# Patient Record
Sex: MALE | Race: BLACK OR AFRICAN AMERICAN | HISPANIC OR LATINO | Employment: OTHER | URBAN - METROPOLITAN AREA
[De-identification: names, ages, dates, MRNs, and addresses within clinical notes are randomized per-mention and may not be internally consistent; named-entity substitution may affect disease eponyms.]

---

## 2018-05-23 LAB
EST. AVERAGE GLUCOSE BLD GHB EST-MCNC: 169 MG/DL
HBA1C MFR BLD HPLC: 7.5 %

## 2018-08-20 DIAGNOSIS — E11.9 TYPE 2 DIABETES MELLITUS WITHOUT COMPLICATION, WITHOUT LONG-TERM CURRENT USE OF INSULIN (HCC): Primary | ICD-10-CM

## 2018-08-20 DIAGNOSIS — E11.9 TYPE 2 DIABETES MELLITUS WITHOUT COMPLICATION, WITHOUT LONG-TERM CURRENT USE OF INSULIN (HCC): ICD-10-CM

## 2018-08-20 RX ORDER — EMPAGLIFLOZIN 10 MG/1
10 TABLET, FILM COATED ORAL DAILY
Qty: 30 TABLET | Refills: 3 | Status: SHIPPED | OUTPATIENT
Start: 2018-08-20 | End: 2018-11-01

## 2018-08-20 RX ORDER — EMPAGLIFLOZIN 10 MG/1
TABLET, FILM COATED ORAL
Refills: 3 | COMMUNITY
Start: 2018-06-29 | End: 2018-08-20 | Stop reason: SDUPTHER

## 2018-08-20 RX ORDER — EMPAGLIFLOZIN 10 MG/1
10 TABLET, FILM COATED ORAL DAILY
Qty: 30 TABLET | Refills: 3 | Status: SHIPPED | OUTPATIENT
Start: 2018-08-20 | End: 2018-08-20 | Stop reason: SDUPTHER

## 2018-08-21 ENCOUNTER — APPOINTMENT (OUTPATIENT)
Dept: LAB | Facility: HOSPITAL | Age: 48
End: 2018-08-21
Payer: COMMERCIAL

## 2018-08-21 ENCOUNTER — TRANSCRIBE ORDERS (OUTPATIENT)
Dept: ADMINISTRATIVE | Facility: HOSPITAL | Age: 48
End: 2018-08-21

## 2018-08-21 DIAGNOSIS — E11.9 TYPE 2 DIABETES MELLITUS WITHOUT COMPLICATION, UNSPECIFIED WHETHER LONG TERM INSULIN USE (HCC): Primary | ICD-10-CM

## 2018-08-21 DIAGNOSIS — E78.1 PURE HYPERGLYCERIDEMIA: ICD-10-CM

## 2018-08-21 DIAGNOSIS — E11.9 TYPE 2 DIABETES MELLITUS WITHOUT COMPLICATION, UNSPECIFIED WHETHER LONG TERM INSULIN USE (HCC): ICD-10-CM

## 2018-08-21 LAB
CHOLEST SERPL-MCNC: 173 MG/DL
EST. AVERAGE GLUCOSE BLD GHB EST-MCNC: 146 MG/DL
HBA1C MFR BLD: 6.7 % (ref 4.2–6.3)
HDLC SERPL-MCNC: 49 MG/DL (ref 40–59)
LDLC SERPL CALC-MCNC: 110 MG/DL
NONHDLC SERPL-MCNC: 124 MG/DL
TRIGL SERPL-MCNC: 69 MG/DL

## 2018-08-21 PROCEDURE — 80061 LIPID PANEL: CPT

## 2018-08-21 PROCEDURE — 83036 HEMOGLOBIN GLYCOSYLATED A1C: CPT

## 2018-08-21 PROCEDURE — 36415 COLL VENOUS BLD VENIPUNCTURE: CPT

## 2018-08-21 RX ORDER — GLUCOSAMINE HCL/CHONDROITIN SU 500-400 MG
CAPSULE ORAL
COMMUNITY
Start: 2018-03-06

## 2018-08-21 RX ORDER — LANCETS 33 GAUGE
EACH MISCELLANEOUS
COMMUNITY
Start: 2018-03-09 | End: 2018-08-21

## 2018-08-23 ENCOUNTER — OFFICE VISIT (OUTPATIENT)
Dept: FAMILY MEDICINE CLINIC | Facility: CLINIC | Age: 48
End: 2018-08-23
Payer: COMMERCIAL

## 2018-08-23 VITALS
DIASTOLIC BLOOD PRESSURE: 82 MMHG | BODY MASS INDEX: 32.14 KG/M2 | WEIGHT: 217 LBS | SYSTOLIC BLOOD PRESSURE: 120 MMHG | TEMPERATURE: 98 F | HEIGHT: 69 IN | OXYGEN SATURATION: 98 % | RESPIRATION RATE: 16 BRPM | HEART RATE: 76 BPM

## 2018-08-23 DIAGNOSIS — E11.9 DIABETES MELLITUS WITHOUT COMPLICATION (HCC): Primary | ICD-10-CM

## 2018-08-23 PROCEDURE — 99213 OFFICE O/P EST LOW 20 MIN: CPT | Performed by: FAMILY MEDICINE

## 2018-08-23 NOTE — PROGRESS NOTES
Assessment/Plan:    Diabetes mellitus without complication ([facility] 75 )  Lab Results   Component Value Date    HGBA1C 6 7 (H) 08/21/2018       No results for input(s): POCGLU in the last 72 hours  Blood Sugar Average: Last 72 hrs:  Patient's hemoglobin A1c has dropped significantly due to patient's compliance with medication diet and exercise  Therefore we will stop insulin today and increase jardiance 25 mg p o  daily  I advised patient and wife to make sure he takes blood sugars daily fasting in the morning and write them down for me so that in 2-3 weeks we can review sugars  I advised patient that with jardiance as he must drink plenty of water  Patient will monitor blood sugars at home and has acknowledged that if sugars are low he will call me or sugars are high he will call me as well  Diagnoses and all orders for this visit:    Diabetes mellitus without complication (HCC)  -     Empagliflozin (JARDIANCE) 25 MG TABS; Take 1 tablet (25 mg total) by mouth every morning  -     HEMOGLOBIN A1C W/ EAG ESTIMATION; Future    Other orders  -     Discontinue: insulin glargine (BASAGLAR KWIKPEN) 100 units/mL injection pen; inject 22 Unit by Subcutaneous route  every evening as per insulin protocol  -     ONE TOUCH ULTRA TEST test strip; TEST BID  -     Alcohol Swabs 70 % PADS; As directed  -     ONETOUCH DELICA LANCETS FINE MISC; USE BID  -     Discontinue: LANCETS MICRO THIN 33G MISC; test BID  -     glucose blood test strip; test BID          Subjective:      Patient ID: Krysten Staples is a 51 y o  male  This is a pleasant 50-year-old male with no past medical history of type 2 diabetes mellitus who presents today for follow-up visit  Patient states that he has been compliant with his medications and has been taking his blood sugars every morning and writing them down in a notebook that he forgot to bring  Patient denies any chest pain, shortness of breath, nausea vomiting, or blood in the stool  The following portions of the patient's history were reviewed and updated as appropriate:   He  has a past medical history of Anxiety and Depression  He   Patient Active Problem List    Diagnosis Date Noted    [facility] ([facility] ) 22/72/3361     He  has a past surgical history that includes Cholecystectomy (2007) and Hernia repair (2006)  His family history includes Cancer in his maternal uncle; Diabetes in his mother; Hypertension in his maternal grandfather; Nephrolithiasis in his family; Osteoarthritis in his maternal grandmother  He  reports that he quit smoking about 13 years ago  He has never used smokeless tobacco  He reports that he does not drink alcohol or use drugs  Current Outpatient Prescriptions   Medication Sig Dispense Refill    Alcohol Swabs 70 % PADS As directed      glucose blood test strip test BID      Empagliflozin (JARDIANCE) 25 MG TABS Take 1 tablet (25 mg total) by mouth every morning 30 tablet 3    JARDIANCE 10 MG TABS Take 1 tablet (10 mg total) by mouth daily 30 tablet 3    ONE TOUCH ULTRA TEST test strip TEST BID  5    ONETOUCH DELICA LANCETS FINE MISC USE BID  2     No current facility-administered medications for this visit       Review of Systems   Constitutional: Negative for fatigue and fever  HENT: Negative for congestion and sore throat  Eyes: Negative for visual disturbance  Respiratory: Negative for cough, shortness of breath and wheezing  Cardiovascular: Negative for chest pain, palpitations and leg swelling  Gastrointestinal: Negative for abdominal pain, anal bleeding, constipation, diarrhea, nausea and vomiting  Endocrine: Negative for cold intolerance and heat intolerance  Musculoskeletal: Negative for arthralgias and joint swelling  Skin: Negative for color change  Neurological: Negative for dizziness, seizures, syncope, weakness and light-headedness     Psychiatric/Behavioral: Negative for agitation, confusion, sleep disturbance and suicidal ideas  Objective:      /82 (BP Location: Left arm, Patient Position: Sitting, Cuff Size: Adult)   Pulse 76   Temp 98 °F (36 7 °C) (Tympanic)   Resp 16   Ht 5' 9" (1 753 m)   Wt 98 4 kg (217 lb)   SpO2 98%   BMI 32 05 kg/m²          Physical Exam   Constitutional: He is oriented to person, place, and time  He appears well-developed and well-nourished  HENT:   Head: Normocephalic and atraumatic  Eyes: Conjunctivae and EOM are normal  Pupils are equal, round, and reactive to light  Neck: Normal range of motion  Neck supple  No JVD present  Cardiovascular: Normal rate, regular rhythm, normal heart sounds and intact distal pulses  Exam reveals no gallop and no friction rub  No murmur heard  Pulmonary/Chest: Effort normal and breath sounds normal  No respiratory distress  He has no wheezes  Abdominal: Soft  Bowel sounds are normal  He exhibits no distension  There is no tenderness  Musculoskeletal: Normal range of motion  Neurological: He is alert and oriented to person, place, and time  He has normal reflexes  Skin: Skin is warm and dry     Psychiatric: His behavior is normal  Judgment and thought content normal

## 2018-08-23 NOTE — ASSESSMENT & PLAN NOTE
Lab Results   Component Value Date    HGBA1C 6 7 (H) 08/21/2018       No results for input(s): POCGLU in the last 72 hours  Blood Sugar Average: Last 72 hrs:  Patient's hemoglobin A1c has dropped significantly due to patient's compliance with medication diet and exercise  Therefore we will stop insulin today and increase jardiance 25 mg p o  daily  I advised patient and wife to make sure he takes blood sugars daily fasting in the morning and write them down for me so that in 2-3 weeks we can review sugars  I advised patient that with jardiance as he must drink plenty of water  Patient will monitor blood sugars at home and has acknowledged that if sugars are low he will call me or sugars are high he will call me as well

## 2018-08-28 DIAGNOSIS — E11.9 TYPE 2 DIABETES MELLITUS WITHOUT COMPLICATION, WITHOUT LONG-TERM CURRENT USE OF INSULIN (HCC): Primary | ICD-10-CM

## 2018-11-01 DIAGNOSIS — E11.9 DIABETES MELLITUS WITHOUT COMPLICATION (HCC): ICD-10-CM

## 2018-11-07 ENCOUNTER — TELEPHONE (OUTPATIENT)
Dept: FAMILY MEDICINE CLINIC | Facility: CLINIC | Age: 48
End: 2018-11-07

## 2018-11-07 DIAGNOSIS — E11.9 TYPE 2 DIABETES MELLITUS WITHOUT COMPLICATION, WITHOUT LONG-TERM CURRENT USE OF INSULIN (HCC): Primary | ICD-10-CM

## 2018-11-08 ENCOUNTER — TELEPHONE (OUTPATIENT)
Dept: FAMILY MEDICINE CLINIC | Facility: CLINIC | Age: 48
End: 2018-11-08

## 2018-11-08 ENCOUNTER — TELEPHONE (OUTPATIENT)
Dept: OTHER | Facility: OTHER | Age: 48
End: 2018-11-08

## 2018-11-08 DIAGNOSIS — E11.9 DM (DIABETES MELLITUS) (HCC): Primary | ICD-10-CM

## 2018-11-08 RX ORDER — SYRINGE AND NEEDLE,INSULIN,1ML 31 GX5/16"
SYRINGE, EMPTY DISPOSABLE MISCELLANEOUS
Qty: 1 KIT | Refills: 0 | Status: SHIPPED | OUTPATIENT
Start: 2018-11-08 | End: 2018-11-13 | Stop reason: CLARIF

## 2018-11-08 NOTE — TELEPHONE ENCOUNTER
Health call:  I was paged by will give pharmacy regarding patient diabetes glucose strips and glucometer as are not currently accepted by his insurance  Prescribed the patient new machine and new strips that are accepted by his insurance with the help of  [facility]  Free style glucometer and test strips ordered

## 2018-11-08 NOTE — TELEPHONE ENCOUNTER
Paged Dr Ricardo Nobles directly to [facility] regarding the patient's medication

## 2018-11-13 DIAGNOSIS — E11.9 TYPE 2 DIABETES MELLITUS WITHOUT COMPLICATION, WITHOUT LONG-TERM CURRENT USE OF INSULIN (HCC): Primary | ICD-10-CM

## 2018-11-13 RX ORDER — LANCETS 28 GAUGE
EACH MISCELLANEOUS DAILY
Qty: 100 EACH | Refills: 1 | Status: SHIPPED | OUTPATIENT
Start: 2018-11-13 | End: 2019-03-18 | Stop reason: SDUPTHER

## 2018-11-13 RX ORDER — BLOOD-GLUCOSE METER
KIT MISCELLANEOUS DAILY
Qty: 1 EACH | Refills: 0 | Status: SHIPPED | OUTPATIENT
Start: 2018-11-13

## 2018-11-13 NOTE — TELEPHONE ENCOUNTER
They don't make the meter ordered previously  I have ordered what patient will need here  Strips currently listed are good with the meter I added

## 2018-11-26 ENCOUNTER — OFFICE VISIT (OUTPATIENT)
Dept: FAMILY MEDICINE CLINIC | Facility: CLINIC | Age: 48
End: 2018-11-26
Payer: COMMERCIAL

## 2018-11-26 VITALS
BODY MASS INDEX: 31.99 KG/M2 | DIASTOLIC BLOOD PRESSURE: 80 MMHG | OXYGEN SATURATION: 97 % | SYSTOLIC BLOOD PRESSURE: 112 MMHG | HEART RATE: 74 BPM | HEIGHT: 69 IN | WEIGHT: 216 LBS | RESPIRATION RATE: 16 BRPM | TEMPERATURE: 96.6 F

## 2018-11-26 DIAGNOSIS — Z23 NEED FOR INFLUENZA VACCINATION: Primary | ICD-10-CM

## 2018-11-26 DIAGNOSIS — Z23 ENCOUNTER FOR IMMUNIZATION: ICD-10-CM

## 2018-11-26 DIAGNOSIS — E11.9 DIABETES MELLITUS WITHOUT COMPLICATION (HCC): ICD-10-CM

## 2018-11-26 PROCEDURE — 1036F TOBACCO NON-USER: CPT | Performed by: FAMILY MEDICINE

## 2018-11-26 PROCEDURE — 99214 OFFICE O/P EST MOD 30 MIN: CPT | Performed by: FAMILY MEDICINE

## 2018-11-26 PROCEDURE — 3008F BODY MASS INDEX DOCD: CPT | Performed by: FAMILY MEDICINE

## 2018-11-26 PROCEDURE — 90732 PPSV23 VACC 2 YRS+ SUBQ/IM: CPT | Performed by: FAMILY MEDICINE

## 2018-11-26 PROCEDURE — 90471 IMMUNIZATION ADMIN: CPT | Performed by: FAMILY MEDICINE

## 2018-11-26 RX ORDER — AMMONIUM LACTATE 12 G/100G
LOTION TOPICAL 2 TIMES DAILY PRN
Qty: 400 G | Refills: 0 | Status: SHIPPED | OUTPATIENT
Start: 2018-11-26 | End: 2018-12-17 | Stop reason: SDUPTHER

## 2018-11-26 RX ORDER — EMPAGLIFLOZIN 10 MG/1
TABLET, FILM COATED ORAL
Refills: 3 | COMMUNITY
Start: 2018-11-18 | End: 2019-03-18 | Stop reason: SDUPTHER

## 2018-11-26 NOTE — ASSESSMENT & PLAN NOTE
Lab Results   Component Value Date    HGBA1C 6 7 (H) 08/21/2018       No results for input(s): POCGLU in the last 72 hours  Blood Sugar Average: Last 72 hrs:  - Will repeat MIA2F if applicable  Depending on results will consider decreasing jardiance to 10 mg daily; advised patient to continue hydration throughout the day     - Will continue with current medications  - Patient denies any episodes of hypoglycemia  - Patient educated on the importance of proper dieting and exercise

## 2018-11-26 NOTE — PROGRESS NOTES
Assessment/Plan:    Diabetes mellitus without complication ([place] [address] )  Lab Results   Component Value Date    HGBA1C 6 7 (H) 08/21/2018       No results for input(s): POCGLU in the last 72 hours  Blood Sugar Average: Last 72 hrs:  - Will repeat MYV3C if applicable  Depending on results will consider decreasing jardiance to 10 mg daily; advised patient to continue hydration throughout the day  - Will continue with current medications  - Patient denies any episodes of hypoglycemia  - Patient educated on the importance of proper dieting and exercise       Need for influenza vaccination  Patient refusing flu vaccine  Encounter for immunization  Patient given pneumococcal vaccination  Diagnoses and all orders for this visit:    Need for influenza vaccination  -     Cancel: FIRST LINE: influenza vaccine, [id number], quadrivalent, recombinant, PF, 0 5 mL (FLUBLOK)    Encounter for immunization  -     PNEUMOCOCCAL POLYSACCHARIDE VACCINE 23-VALENT =>1YO SQ IM    Diabetes mellitus without complication ([place] [address] )  -     HEMOGLOBIN A1C W/ EAG ESTIMATION; Future  -     Basic metabolic panel; Future  -     ammonium lactate (LAC-HYDRIN) 12 % lotion; Apply topically 2 (two) times a day as needed for dry skin    Other orders  -     JARDIANCE 10 MG TABS; TK 1 T PO QD IN THE MORNING          Subjective:      Patient ID: Kevin Vale is a 51 y o  male  This is a pleasant 67-year-old male with no past medical history of type 2 diabetes mellitus who presents today for follow-up visit  Patient states that he has been somewhat compliant with his medications and has been taking his blood sugars every morning with an average of 100  Patient denies any chest pain, shortness of breath, nausea vomiting, or blood in the stool  The following portions of the patient's history were reviewed and updated as appropriate:   He  has a past medical history of Anxiety and Depression    He   Patient Active Problem List    Diagnosis Date Noted    Need for influenza vaccination 11/26/2018    Encounter for immunization 11/26/2018    Diabetes mellitus without complication ([facility] ) 21/72/7258     He  has a past surgical history that includes Cholecystectomy (2007) and Hernia repair (2006)  His family history includes Cancer in his maternal uncle; Diabetes in his mother; Hypertension in his maternal grandfather; Nephrolithiasis in his family; Osteoarthritis in his maternal grandmother  He  reports that he quit smoking about 13 years ago  He has never used smokeless tobacco  He reports that he does not drink alcohol or use drugs  Current Outpatient Prescriptions   Medication Sig Dispense Refill    Alcohol Swabs 70 % PADS As directed      ammonium lactate (LAC-HYDRIN) 12 % lotion Apply topically 2 (two) times a day as needed for dry skin 400 g 0    Blood Glucose Monitoring Suppl (FREESTYLE LITE) FRANCIS by Other route daily 1 each 0    glucose blood (FREESTYLE LITE) test strip Use as instructed 100 each 0    JARDIANCE 10 MG TABS TK 1 T PO QD IN THE MORNING  3    Lancets (FREESTYLE) lancets by Other route daily Use as instructed 100 each 1     No current facility-administered medications for this visit       Review of Systems   Constitutional: Negative for fatigue and fever  HENT: Negative for congestion and sore throat  Eyes: Negative for visual disturbance  Respiratory: Negative for cough, shortness of breath and wheezing  Cardiovascular: Negative for chest pain, palpitations and leg swelling  Gastrointestinal: Negative for abdominal pain, anal bleeding, constipation, diarrhea, nausea and vomiting  Endocrine: Negative for cold intolerance and heat intolerance  Musculoskeletal: Negative for arthralgias and joint swelling  Skin: Negative for color change  Neurological: Negative for dizziness, seizures, syncope, weakness and light-headedness     Psychiatric/Behavioral: Negative for agitation, confusion, sleep disturbance and suicidal ideas  Objective:      /80 (BP Location: Right arm, Patient Position: Sitting, Cuff Size: Large)   Pulse 74   Temp (!) 96 6 °F (35 9 °C) (Temporal)   Resp 16   Ht 5' 9" (1 753 m)   Wt 98 kg (216 lb)   SpO2 97%   BMI 31 90 kg/m²       Patient's shoes and socks removed  Right Foot/Ankle   Right Foot Inspection  Skin Exam: skin normal and skin intact no dry skin, no warmth, no callus, no erythema, no maceration, no abnormal color, no pre-ulcer, no ulcer and no callus                          Toe Exam: ROM and strength within normal limits  Sensory   Vibration: intact  Proprioception: intact   Monofilament testing: intact  Vascular  Capillary refills: < 3 seconds  The right DP pulse is 2+  The right PT pulse is 2+  Left Foot/Ankle  Left Foot Inspection  Skin Exam: skin normal and skin intactno dry skin, no warmth, no erythema, no maceration, normal color, no pre-ulcer, no ulcer and no callus                         Toe Exam: ROM and strength within normal limits                   Sensory   Vibration: intact  Proprioception: intact  Monofilament: intact  Vascular  Capillary refills: < 3 seconds  The left DP pulse is 2+  The left PT pulse is 2+  Assign Risk Category:  No deformity present; No loss of protective sensation; No weak pulses       Risk: 0     Physical Exam   Constitutional: He is oriented to person, place, and time  He appears well-developed and well-nourished  HENT:   Head: Normocephalic and atraumatic  Eyes: Pupils are equal, round, and reactive to light  Conjunctivae and EOM are normal    Neck: Normal range of motion  Neck supple  No JVD present  Cardiovascular: Normal rate, regular rhythm, normal heart sounds and intact distal pulses  Exam reveals no gallop and no friction rub  Pulses are no weak pulses  No murmur heard  Pulses:       Dorsalis pedis pulses are 2+ on the right side, and 2+ on the left side          Posterior tibial pulses are 2+ on the right side, and 2+ on the left side  Pulmonary/Chest: Effort normal and breath sounds normal  No respiratory distress  He has no wheezes  Abdominal: Soft  Bowel sounds are normal  He exhibits no distension  There is no tenderness  Musculoskeletal: Normal range of motion  Feet:   Right Foot:   Skin Integrity: Negative for ulcer, skin breakdown, erythema, warmth, callus or dry skin  Left Foot:   Skin Integrity: Negative for ulcer, skin breakdown, erythema, warmth, callus or dry skin  Neurological: He is alert and oriented to person, place, and time  He has normal reflexes  Skin: Skin is warm and dry     Psychiatric: His behavior is normal  Judgment and thought content normal

## 2018-11-28 ENCOUNTER — APPOINTMENT (OUTPATIENT)
Dept: LAB | Facility: HOSPITAL | Age: 48
End: 2018-11-28
Payer: COMMERCIAL

## 2018-11-28 DIAGNOSIS — E11.9 DIABETES MELLITUS WITHOUT COMPLICATION (HCC): ICD-10-CM

## 2018-11-28 LAB
ANION GAP SERPL CALCULATED.3IONS-SCNC: 10 MMOL/L (ref 5–14)
BUN SERPL-MCNC: 17 MG/DL (ref 5–25)
CALCIUM SERPL-MCNC: 9.3 MG/DL (ref 8.4–10.2)
CHLORIDE SERPL-SCNC: 102 MMOL/L (ref 97–108)
CO2 SERPL-SCNC: 29 MMOL/L (ref 22–30)
CREAT SERPL-MCNC: 0.76 MG/DL (ref 0.7–1.5)
CREAT UR-MCNC: 99.7 MG/DL
EST. AVERAGE GLUCOSE BLD GHB EST-MCNC: 154 MG/DL
GFR SERPL CREATININE-BSD FRML MDRD: 107 ML/MIN/1.73SQ M
GLUCOSE P FAST SERPL-MCNC: 120 MG/DL (ref 70–99)
HBA1C MFR BLD: 7 % (ref 4.2–6.3)
MICROALBUMIN UR-MCNC: 8 MG/L (ref 0–20)
MICROALBUMIN/CREAT 24H UR: 8 MG/G CREATININE (ref 0–30)
POTASSIUM SERPL-SCNC: 4.3 MMOL/L (ref 3.6–5)
SODIUM SERPL-SCNC: 141 MMOL/L (ref 137–147)

## 2018-11-28 PROCEDURE — 83036 HEMOGLOBIN GLYCOSYLATED A1C: CPT

## 2018-11-28 PROCEDURE — 36415 COLL VENOUS BLD VENIPUNCTURE: CPT

## 2018-11-28 PROCEDURE — 80048 BASIC METABOLIC PNL TOTAL CA: CPT

## 2018-11-28 PROCEDURE — 82043 UR ALBUMIN QUANTITATIVE: CPT

## 2018-11-28 PROCEDURE — 82570 ASSAY OF URINE CREATININE: CPT

## 2018-11-29 ENCOUNTER — TELEPHONE (OUTPATIENT)
Dept: FAMILY MEDICINE CLINIC | Facility: CLINIC | Age: 48
End: 2018-11-29

## 2018-11-29 NOTE — TELEPHONE ENCOUNTER
Called patient to inform him of HBA1C results  Stressed to patient the importance of taking his medication every day as directed  Patient and spouse acknowledged

## 2018-12-17 DIAGNOSIS — E11.9 DIABETES MELLITUS WITHOUT COMPLICATION (HCC): ICD-10-CM

## 2018-12-17 RX ORDER — AMMONIUM LACTATE 12 G/100G
LOTION TOPICAL 2 TIMES DAILY PRN
Qty: 400 G | Refills: 0 | Status: SHIPPED | OUTPATIENT
Start: 2018-12-17 | End: 2019-10-16

## 2019-03-13 DIAGNOSIS — E11.9 DM (DIABETES MELLITUS) (HCC): ICD-10-CM

## 2019-03-18 DIAGNOSIS — E11.9 TYPE 2 DIABETES MELLITUS WITHOUT COMPLICATION, WITHOUT LONG-TERM CURRENT USE OF INSULIN (HCC): ICD-10-CM

## 2019-03-18 DIAGNOSIS — E11.9 DM (DIABETES MELLITUS) (HCC): ICD-10-CM

## 2019-03-18 RX ORDER — LANCETS 28 GAUGE
EACH MISCELLANEOUS DAILY
Qty: 100 EACH | Refills: 5 | Status: SHIPPED | OUTPATIENT
Start: 2019-03-18 | End: 2019-06-20 | Stop reason: SDUPTHER

## 2019-03-18 RX ORDER — EMPAGLIFLOZIN 10 MG/1
10 TABLET, FILM COATED ORAL DAILY
Qty: 90 TABLET | Refills: 1 | Status: SHIPPED | OUTPATIENT
Start: 2019-03-18 | End: 2019-06-20 | Stop reason: SDUPTHER

## 2019-03-21 ENCOUNTER — APPOINTMENT (OUTPATIENT)
Dept: LAB | Facility: HOSPITAL | Age: 49
End: 2019-03-21
Payer: COMMERCIAL

## 2019-03-21 ENCOUNTER — TRANSCRIBE ORDERS (OUTPATIENT)
Dept: ADMINISTRATIVE | Facility: HOSPITAL | Age: 49
End: 2019-03-21

## 2019-03-21 DIAGNOSIS — E11.9 TYPE 2 DIABETES MELLITUS WITHOUT COMPLICATION, WITHOUT LONG-TERM CURRENT USE OF INSULIN (HCC): ICD-10-CM

## 2019-03-21 LAB
EST. AVERAGE GLUCOSE BLD GHB EST-MCNC: 140 MG/DL
HBA1C MFR BLD: 6.5 % (ref 4.2–6.3)

## 2019-03-21 PROCEDURE — 83036 HEMOGLOBIN GLYCOSYLATED A1C: CPT

## 2019-03-21 PROCEDURE — 36415 COLL VENOUS BLD VENIPUNCTURE: CPT

## 2019-03-22 ENCOUNTER — TELEPHONE (OUTPATIENT)
Dept: FAMILY MEDICINE CLINIC | Facility: CLINIC | Age: 49
End: 2019-03-22

## 2019-06-10 ENCOUNTER — OFFICE VISIT (OUTPATIENT)
Dept: FAMILY MEDICINE CLINIC | Facility: CLINIC | Age: 49
End: 2019-06-10

## 2019-06-10 VITALS
HEIGHT: 69 IN | BODY MASS INDEX: 31.55 KG/M2 | SYSTOLIC BLOOD PRESSURE: 122 MMHG | OXYGEN SATURATION: 98 % | WEIGHT: 213 LBS | HEART RATE: 79 BPM | TEMPERATURE: 97.6 F | DIASTOLIC BLOOD PRESSURE: 78 MMHG | RESPIRATION RATE: 18 BRPM

## 2019-06-10 DIAGNOSIS — R21 LOCALIZED RASH: ICD-10-CM

## 2019-06-10 DIAGNOSIS — M75.42 IMPINGEMENT SYNDROME OF LEFT SHOULDER: Primary | ICD-10-CM

## 2019-06-10 PROBLEM — Z23 NEED FOR INFLUENZA VACCINATION: Status: RESOLVED | Noted: 2018-11-26 | Resolved: 2019-06-10

## 2019-06-10 PROBLEM — Z23 ENCOUNTER FOR IMMUNIZATION: Status: RESOLVED | Noted: 2018-11-26 | Resolved: 2019-06-10

## 2019-06-10 PROCEDURE — 99213 OFFICE O/P EST LOW 20 MIN: CPT | Performed by: INTERNAL MEDICINE

## 2019-06-10 PROCEDURE — 20610 DRAIN/INJ JOINT/BURSA W/O US: CPT | Performed by: INTERNAL MEDICINE

## 2019-06-10 RX ORDER — TRIAMCINOLONE ACETONIDE 40 MG/ML
40 INJECTION, SUSPENSION INTRA-ARTICULAR; INTRAMUSCULAR
Status: COMPLETED | OUTPATIENT
Start: 2019-06-10 | End: 2019-06-10

## 2019-06-10 RX ORDER — TRIAMCINOLONE ACETONIDE 5 MG/G
CREAM TOPICAL 3 TIMES DAILY
Qty: 30 G | Refills: 1 | Status: SHIPPED | OUTPATIENT
Start: 2019-06-10 | End: 2019-10-16

## 2019-06-10 RX ORDER — LIDOCAINE HYDROCHLORIDE 20 MG/ML
3 INJECTION, SOLUTION EPIDURAL; INFILTRATION; INTRACAUDAL; PERINEURAL
Status: COMPLETED | OUTPATIENT
Start: 2019-06-10 | End: 2019-06-10

## 2019-06-10 RX ADMIN — LIDOCAINE HYDROCHLORIDE 3 ML: 20 INJECTION, SOLUTION EPIDURAL; INFILTRATION; INTRACAUDAL; PERINEURAL at 15:59

## 2019-06-10 RX ADMIN — TRIAMCINOLONE ACETONIDE 40 MG: 40 INJECTION, SUSPENSION INTRA-ARTICULAR; INTRAMUSCULAR at 15:59

## 2019-06-20 ENCOUNTER — OFFICE VISIT (OUTPATIENT)
Dept: FAMILY MEDICINE CLINIC | Facility: CLINIC | Age: 49
End: 2019-06-20

## 2019-06-20 VITALS
BODY MASS INDEX: 31.99 KG/M2 | OXYGEN SATURATION: 98 % | DIASTOLIC BLOOD PRESSURE: 70 MMHG | TEMPERATURE: 97.6 F | RESPIRATION RATE: 18 BRPM | WEIGHT: 216 LBS | HEART RATE: 72 BPM | SYSTOLIC BLOOD PRESSURE: 122 MMHG | HEIGHT: 69 IN

## 2019-06-20 DIAGNOSIS — E11.9 TYPE 2 DIABETES MELLITUS WITHOUT COMPLICATION, WITHOUT LONG-TERM CURRENT USE OF INSULIN (HCC): ICD-10-CM

## 2019-06-20 DIAGNOSIS — Z53.20 COLONOSCOPY REFUSED: ICD-10-CM

## 2019-06-20 DIAGNOSIS — E11.9 DM (DIABETES MELLITUS) (HCC): ICD-10-CM

## 2019-06-20 DIAGNOSIS — E11.9 DIABETES MELLITUS WITHOUT COMPLICATION (HCC): Primary | ICD-10-CM

## 2019-06-20 PROBLEM — R21 LOCALIZED RASH: Status: RESOLVED | Noted: 2019-06-10 | Resolved: 2019-06-20

## 2019-06-20 PROBLEM — M75.42 IMPINGEMENT SYNDROME OF LEFT SHOULDER: Status: RESOLVED | Noted: 2019-06-10 | Resolved: 2019-06-20

## 2019-06-20 LAB — SL AMB POCT HEMOGLOBIN AIC: 7.1 (ref ?–6.5)

## 2019-06-20 PROCEDURE — 83036 HEMOGLOBIN GLYCOSYLATED A1C: CPT | Performed by: INTERNAL MEDICINE

## 2019-06-20 PROCEDURE — 99213 OFFICE O/P EST LOW 20 MIN: CPT | Performed by: INTERNAL MEDICINE

## 2019-06-20 RX ORDER — EMPAGLIFLOZIN 10 MG/1
10 TABLET, FILM COATED ORAL DAILY
Qty: 90 TABLET | Refills: 2 | Status: SHIPPED | OUTPATIENT
Start: 2019-06-20 | End: 2019-09-04 | Stop reason: SDUPTHER

## 2019-06-20 RX ORDER — LANCETS 28 GAUGE
EACH MISCELLANEOUS DAILY
Qty: 100 EACH | Refills: 5 | Status: SHIPPED | OUTPATIENT
Start: 2019-06-20

## 2019-09-04 DIAGNOSIS — E11.9 TYPE 2 DIABETES MELLITUS WITHOUT COMPLICATION, WITHOUT LONG-TERM CURRENT USE OF INSULIN (HCC): ICD-10-CM

## 2019-09-04 RX ORDER — EMPAGLIFLOZIN 10 MG/1
10 TABLET, FILM COATED ORAL DAILY
Qty: 90 TABLET | Refills: 2 | Status: SHIPPED | OUTPATIENT
Start: 2019-09-04 | End: 2019-10-16 | Stop reason: SDUPTHER

## 2019-09-04 NOTE — PROGRESS NOTES
- refilled patient's medication and sent to lab to draw BMP, lipid panel and A1c follow-up next week to review labs

## 2019-10-08 ENCOUNTER — APPOINTMENT (OUTPATIENT)
Dept: LAB | Facility: HOSPITAL | Age: 49
End: 2019-10-08
Attending: FAMILY MEDICINE
Payer: COMMERCIAL

## 2019-10-08 DIAGNOSIS — E11.9 TYPE 2 DIABETES MELLITUS WITHOUT COMPLICATION, WITHOUT LONG-TERM CURRENT USE OF INSULIN (HCC): ICD-10-CM

## 2019-10-08 LAB
ANION GAP SERPL CALCULATED.3IONS-SCNC: 8 MMOL/L (ref 5–14)
BUN SERPL-MCNC: 17 MG/DL (ref 5–25)
CALCIUM SERPL-MCNC: 9.8 MG/DL (ref 8.4–10.2)
CHLORIDE SERPL-SCNC: 103 MMOL/L (ref 97–108)
CHOLEST SERPL-MCNC: 184 MG/DL
CO2 SERPL-SCNC: 30 MMOL/L (ref 22–30)
CREAT SERPL-MCNC: 0.78 MG/DL (ref 0.7–1.5)
EST. AVERAGE GLUCOSE BLD GHB EST-MCNC: 143 MG/DL
GFR SERPL CREATININE-BSD FRML MDRD: 105 ML/MIN/1.73SQ M
GLUCOSE P FAST SERPL-MCNC: 131 MG/DL (ref 70–99)
HBA1C MFR BLD: 6.6 % (ref 4.2–6.3)
HDLC SERPL-MCNC: 45 MG/DL (ref 40–59)
LDLC SERPL CALC-MCNC: 127 MG/DL
NONHDLC SERPL-MCNC: 139 MG/DL
POTASSIUM SERPL-SCNC: 4.5 MMOL/L (ref 3.6–5)
SODIUM SERPL-SCNC: 141 MMOL/L (ref 137–147)
TRIGL SERPL-MCNC: 62 MG/DL

## 2019-10-08 PROCEDURE — 80048 BASIC METABOLIC PNL TOTAL CA: CPT

## 2019-10-08 PROCEDURE — 83036 HEMOGLOBIN GLYCOSYLATED A1C: CPT

## 2019-10-08 PROCEDURE — 80061 LIPID PANEL: CPT

## 2019-10-08 PROCEDURE — 36415 COLL VENOUS BLD VENIPUNCTURE: CPT

## 2019-10-16 ENCOUNTER — OFFICE VISIT (OUTPATIENT)
Dept: FAMILY MEDICINE CLINIC | Facility: CLINIC | Age: 49
End: 2019-10-16

## 2019-10-16 VITALS
BODY MASS INDEX: 32.29 KG/M2 | RESPIRATION RATE: 18 BRPM | HEART RATE: 62 BPM | TEMPERATURE: 97.3 F | WEIGHT: 218 LBS | DIASTOLIC BLOOD PRESSURE: 78 MMHG | OXYGEN SATURATION: 98 % | HEIGHT: 69 IN | SYSTOLIC BLOOD PRESSURE: 110 MMHG

## 2019-10-16 DIAGNOSIS — Z23 ENCOUNTER FOR IMMUNIZATION: ICD-10-CM

## 2019-10-16 DIAGNOSIS — E11.9 DIABETES MELLITUS WITHOUT COMPLICATION (HCC): Primary | ICD-10-CM

## 2019-10-16 DIAGNOSIS — E11.9 TYPE 2 DIABETES MELLITUS WITHOUT COMPLICATION, WITHOUT LONG-TERM CURRENT USE OF INSULIN (HCC): ICD-10-CM

## 2019-10-16 PROCEDURE — 99213 OFFICE O/P EST LOW 20 MIN: CPT | Performed by: FAMILY MEDICINE

## 2019-10-16 PROCEDURE — 3725F SCREEN DEPRESSION PERFORMED: CPT | Performed by: FAMILY MEDICINE

## 2019-10-16 PROCEDURE — 1036F TOBACCO NON-USER: CPT | Performed by: FAMILY MEDICINE

## 2019-10-16 PROCEDURE — 3008F BODY MASS INDEX DOCD: CPT | Performed by: FAMILY MEDICINE

## 2019-10-16 RX ORDER — EMPAGLIFLOZIN 10 MG/1
10 TABLET, FILM COATED ORAL DAILY
Qty: 90 TABLET | Refills: 2 | Status: SHIPPED | OUTPATIENT
Start: 2019-10-16 | End: 2019-12-10 | Stop reason: SDUPTHER

## 2019-10-16 NOTE — ASSESSMENT & PLAN NOTE
Lab Results   Component Value Date    HGBA1C 6 6 (H) 10/08/2019       - patient to continue current management with jardiance 10 mg daily  - at this point patient would like to continue dietary lifestyle modifications for reducing his cardiovascular risk instead of taking atorvastatin  Risk and benefits discussed with patient and he is aware

## 2019-10-16 NOTE — PROGRESS NOTES
Chief Complaint   Patient presents with    Diabetes     follow up recent A1C 10/08/19 6 6     /78 (BP Location: Left arm, Patient Position: Sitting, Cuff Size: Large)   Pulse 62   Temp (!) 97 3 °F (36 3 °C) (Temporal)   Resp 18   Ht 5' 9" (1 753 m)   Wt 98 9 kg (218 lb)   SpO2 98%   BMI 32 19 kg/m²      Assessment/Plan   Diabetes mellitus without complication (HCC)    Lab Results   Component Value Date    HGBA1C 6 6 (H) 10/08/2019       - patient to continue current management with jardiance 10 mg daily  - at this point patient would like to continue dietary lifestyle modifications for reducing his cardiovascular risk instead of taking atorvastatin  Risk and benefits discussed with patient and he is aware  Encounter for immunization  - flu vaccine refused today by patient  Diagnoses and all orders for this visit:    Diabetes mellitus without complication ([facility] )    Encounter for immunization    Type 2 diabetes mellitus without complication, without long-term current use of insulin ([facility] )  -     JARDIANCE 10 MG TABS; Take 1 tablet (10 mg total) by mouth daily    Other orders  -     Cancel: influenza vaccine, [id number], quadrivalent, recombinant, PF, 0 5 mL, for patients 18 yr+ (FLUBLOK)       Wenceslao Shoemaker is a 47 y o  male  Information was obtained from the patient  The language used was [place]ish    Patient here for diabetes follow-up and reports that he is doing well  Compliant with his medication and denies any side effects  Reports that he is staying well hydrated and adhering to dietary lifestyle modifications  Patient is asking for refill on his medication as he is planning to leave the country to his hometown for holidays  Otherwise, no other complaints  Review of Systems   Constitutional: Negative for fatigue and fever  HENT: Negative for congestion and sore throat  Eyes: Negative for visual disturbance     Respiratory: Negative for cough, shortness of breath and wheezing  Cardiovascular: Negative for chest pain, palpitations and leg swelling  Gastrointestinal: Negative for abdominal pain, anal bleeding, constipation, diarrhea, nausea and vomiting  Endocrine: Negative for cold intolerance and heat intolerance  Musculoskeletal: Negative for arthralgias and joint swelling  Skin: Negative for color change  Neurological: Negative for dizziness, seizures, syncope, weakness and light-headedness  Psychiatric/Behavioral: Negative for agitation, confusion, sleep disturbance and suicidal ideas  Pertinent items are noted in HPI  Social History  - reports that he quit smoking about 14 years ago  He has never used smokeless tobacco   - reports that he does not drink alcohol    - reports that he does not use drugs  Objective     Physical Exam   Constitutional: He is oriented to person, place, and time  He appears well-developed and well-nourished  He is active and cooperative  He does not have a sickly appearance  He does not appear ill  No distress  HENT:   Head: Normocephalic and atraumatic  Right Ear: External ear normal    Left Ear: External ear normal    Eyes: Conjunctivae are normal    Neck: Normal range of motion  Neck supple  Pulmonary/Chest: Effort normal    Musculoskeletal: Normal range of motion  Neurological: He is alert and oriented to person, place, and time  Skin: No rash noted  No erythema  No pallor  Vitals reviewed  Health Information     The following have been reviewed and updated:   He  has a past medical history of Anxiety and Depression  He   Patient Active Problem List    Diagnosis Date Noted    Colonoscopy refused 06/20/2019    Encounter for immunization 11/26/2018    [facility] 60/98/7718     He  has a past surgical history that includes Cholecystectomy (2007) and Hernia repair (2006)    His family history includes Cancer in his maternal uncle; Diabetes in his mother; Hypertension in his maternal grandfather; Nephrolithiasis in his family; Osteoarthritis in his maternal grandmother  He  reports that he quit smoking about 14 years ago  He has never used smokeless tobacco  He reports that he does not drink alcohol or use drugs  Current Outpatient Medications   Medication Sig Dispense Refill    Alcohol Swabs 70 % PADS As directed      Blood Glucose Monitoring Suppl (FREESTYLE LITE) FRANCIS by Other route daily 1 each 0    glucose blood (FREESTYLE LITE) test strip TEST TWICE DAILY 100 each 5    JARDIANCE 10 MG TABS Take 1 tablet (10 mg total) by mouth daily 90 tablet 2    Lancets (FREESTYLE) lancets by Other route daily Use as instructed 100 each 5     No current facility-administered medications for this visit          Allergies   Allergen Reactions    Morphine GI Intolerance       Current Outpatient Medications:     Alcohol Swabs 70 % PADS, As directed, Disp: , Rfl:     Blood Glucose Monitoring Suppl (FREESTYLE LITE) FRANCIS, by Other route daily, Disp: 1 each, Rfl: 0    glucose blood (FREESTYLE LITE) test strip, TEST TWICE DAILY, Disp: 100 each, Rfl: 5    JARDIANCE 10 MG TABS, Take 1 tablet (10 mg total) by mouth daily, Disp: 90 tablet, Rfl: 2    Lancets (FREESTYLE) lancets, by Other route daily Use as instructed, Disp: 100 each, Rfl: 5    Patient Active Problem List   Diagnosis    [facility] mellitus without [facility] )    Encounter for immunization    Colonoscopy refused     Past Surgical History:   Procedure Laterality Date    CHOLECYSTECTOMY  2007    HERNIA REPAIR  2006     Family History   Problem Relation Age of Onset    Diabetes Mother         Mellitus    Osteoarthritis Maternal Grandmother     Hypertension Maternal Grandfather     Nephrolithiasis Family     Cancer Maternal Uncle         Unknown     Health Maintenance   Topic Date Due    CRC Screening: Colonoscopy  1969    DM Eye Exam  05/21/1979    BMI: Followup Plan  05/21/1987    HEPATITIS B VACCINES (1 of 3 - Risk 3-dose series) 05/21/1988    INFLUENZA VACCINE  07/01/2019    Depression Screening PHQ  08/23/2019    Diabetic Foot Exam  11/26/2019    URINE MICROALBUMIN  11/28/2019    HEMOGLOBIN A1C  04/08/2020    BMI: Adult  06/20/2020    DTaP,Tdap,and Td Vaccines (2 - Td) 09/13/2026    Pneumococcal Vaccine: 65+ Years (1 of 2 - PCV13) 05/21/2034    Pneumococcal Vaccine: Pediatrics (0 to 5 Years) and At-Risk Patients (6 to 59 Years)  Completed

## 2019-12-10 DIAGNOSIS — E11.9 TYPE 2 DIABETES MELLITUS WITHOUT COMPLICATION, WITHOUT LONG-TERM CURRENT USE OF INSULIN (HCC): ICD-10-CM

## 2019-12-10 RX ORDER — EMPAGLIFLOZIN 10 MG/1
10 TABLET, FILM COATED ORAL DAILY
Qty: 90 TABLET | Refills: 2 | Status: SHIPPED | OUTPATIENT
Start: 2019-12-10 | End: 2020-12-22

## 2020-01-15 ENCOUNTER — TELEPHONE (OUTPATIENT)
Dept: FAMILY MEDICINE CLINIC | Facility: CLINIC | Age: 50
End: 2020-01-15

## 2020-01-21 ENCOUNTER — TELEPHONE (OUTPATIENT)
Dept: FAMILY MEDICINE CLINIC | Facility: CLINIC | Age: 50
End: 2020-01-21

## 2020-01-21 PROBLEM — Z23 ENCOUNTER FOR IMMUNIZATION: Status: RESOLVED | Noted: 2018-11-26 | Resolved: 2020-01-21

## 2020-04-30 ENCOUNTER — OFFICE VISIT (OUTPATIENT)
Dept: FAMILY MEDICINE CLINIC | Facility: CLINIC | Age: 50
End: 2020-04-30

## 2020-04-30 VITALS
HEIGHT: 69 IN | HEART RATE: 65 BPM | DIASTOLIC BLOOD PRESSURE: 80 MMHG | RESPIRATION RATE: 22 BRPM | WEIGHT: 217.2 LBS | OXYGEN SATURATION: 92 % | BODY MASS INDEX: 32.17 KG/M2 | SYSTOLIC BLOOD PRESSURE: 128 MMHG | TEMPERATURE: 97 F

## 2020-04-30 DIAGNOSIS — M72.2 PLANTAR FASCIITIS OF RIGHT FOOT: Primary | ICD-10-CM

## 2020-04-30 DIAGNOSIS — E11.9 TYPE 2 DIABETES MELLITUS WITHOUT COMPLICATION, WITHOUT LONG-TERM CURRENT USE OF INSULIN (HCC): ICD-10-CM

## 2020-04-30 DIAGNOSIS — E11.9 DIABETES MELLITUS WITHOUT COMPLICATION (HCC): ICD-10-CM

## 2020-04-30 PROCEDURE — 1036F TOBACCO NON-USER: CPT | Performed by: FAMILY MEDICINE

## 2020-04-30 PROCEDURE — 99213 OFFICE O/P EST LOW 20 MIN: CPT | Performed by: FAMILY MEDICINE

## 2020-04-30 PROCEDURE — 3008F BODY MASS INDEX DOCD: CPT | Performed by: FAMILY MEDICINE

## 2020-04-30 RX ORDER — PREDNISONE 20 MG/1
20 TABLET ORAL DAILY
Qty: 5 TABLET | Refills: 0 | Status: SHIPPED | OUTPATIENT
Start: 2020-04-30 | End: 2020-05-05

## 2020-05-28 DIAGNOSIS — M72.2 PLANTAR FASCIITIS OF RIGHT FOOT: Primary | ICD-10-CM

## 2020-06-10 DIAGNOSIS — E11.9 TYPE 2 DIABETES MELLITUS WITHOUT COMPLICATION, WITHOUT LONG-TERM CURRENT USE OF INSULIN (HCC): Primary | ICD-10-CM

## 2020-06-22 ENCOUNTER — TELEPHONE (OUTPATIENT)
Dept: FAMILY MEDICINE CLINIC | Facility: CLINIC | Age: 50
End: 2020-06-22

## 2020-06-25 ENCOUNTER — OFFICE VISIT (OUTPATIENT)
Dept: FAMILY MEDICINE CLINIC | Facility: CLINIC | Age: 50
End: 2020-06-25

## 2020-06-25 VITALS
TEMPERATURE: 97.5 F | HEIGHT: 69 IN | SYSTOLIC BLOOD PRESSURE: 140 MMHG | DIASTOLIC BLOOD PRESSURE: 86 MMHG | WEIGHT: 214.4 LBS | OXYGEN SATURATION: 97 % | RESPIRATION RATE: 16 BRPM | HEART RATE: 79 BPM | BODY MASS INDEX: 31.76 KG/M2

## 2020-06-25 DIAGNOSIS — E11.9 DIABETES MELLITUS WITHOUT COMPLICATION (HCC): ICD-10-CM

## 2020-06-25 DIAGNOSIS — R07.9 CHEST PAIN, UNSPECIFIED TYPE: Primary | ICD-10-CM

## 2020-06-25 DIAGNOSIS — Z13.220 SCREENING FOR CHOLESTEROL LEVEL: ICD-10-CM

## 2020-06-25 DIAGNOSIS — Z11.4 ENCOUNTER FOR SCREENING FOR HIV: ICD-10-CM

## 2020-06-25 DIAGNOSIS — Z20.822 ENCOUNTER FOR LABORATORY TESTING FOR COVID-19 VIRUS: ICD-10-CM

## 2020-06-25 DIAGNOSIS — R09.82 POST-NASAL DRIP: ICD-10-CM

## 2020-06-25 PROCEDURE — 93000 ELECTROCARDIOGRAM COMPLETE: CPT | Performed by: FAMILY MEDICINE

## 2020-06-25 PROCEDURE — 1036F TOBACCO NON-USER: CPT | Performed by: FAMILY MEDICINE

## 2020-06-25 PROCEDURE — 3008F BODY MASS INDEX DOCD: CPT | Performed by: FAMILY MEDICINE

## 2020-06-25 PROCEDURE — 99214 OFFICE O/P EST MOD 30 MIN: CPT | Performed by: FAMILY MEDICINE

## 2020-06-25 PROCEDURE — 82570 ASSAY OF URINE CREATININE: CPT | Performed by: FAMILY MEDICINE

## 2020-06-25 PROCEDURE — 82043 UR ALBUMIN QUANTITATIVE: CPT | Performed by: FAMILY MEDICINE

## 2020-06-25 RX ORDER — FLUTICASONE PROPIONATE 50 MCG
1 SPRAY, SUSPENSION (ML) NASAL DAILY
Qty: 1 BOTTLE | Refills: 1 | Status: SHIPPED | OUTPATIENT
Start: 2020-06-25

## 2020-06-25 RX ORDER — LORATADINE 10 MG/1
10 TABLET ORAL DAILY
Qty: 30 TABLET | Refills: 1 | Status: SHIPPED | OUTPATIENT
Start: 2020-06-25 | End: 2021-05-27

## 2020-06-26 LAB
CREAT UR-MCNC: 130 MG/DL
MICROALBUMIN UR-MCNC: 24.7 MG/L (ref 0–20)
MICROALBUMIN/CREAT 24H UR: 19 MG/G CREATININE (ref 0–30)

## 2020-06-26 PROCEDURE — 3061F NEG MICROALBUMINURIA REV: CPT | Performed by: FAMILY MEDICINE

## 2020-10-19 ENCOUNTER — TELEPHONE (OUTPATIENT)
Dept: FAMILY MEDICINE CLINIC | Facility: CLINIC | Age: 50
End: 2020-10-19

## 2020-10-19 DIAGNOSIS — M54.41 ACUTE RIGHT-SIDED LOW BACK PAIN WITH RIGHT-SIDED SCIATICA: Primary | ICD-10-CM

## 2020-10-19 RX ORDER — NAPROXEN 500 MG/1
500 TABLET ORAL 2 TIMES DAILY WITH MEALS
Qty: 30 TABLET | Refills: 0 | Status: SHIPPED | OUTPATIENT
Start: 2020-10-19 | End: 2020-11-05

## 2020-11-05 ENCOUNTER — OFFICE VISIT (OUTPATIENT)
Dept: FAMILY MEDICINE CLINIC | Facility: CLINIC | Age: 50
End: 2020-11-05

## 2020-11-05 VITALS
DIASTOLIC BLOOD PRESSURE: 84 MMHG | BODY MASS INDEX: 32.5 KG/M2 | HEART RATE: 75 BPM | HEIGHT: 69 IN | RESPIRATION RATE: 20 BRPM | TEMPERATURE: 98.1 F | WEIGHT: 219.4 LBS | SYSTOLIC BLOOD PRESSURE: 128 MMHG | OXYGEN SATURATION: 96 %

## 2020-11-05 DIAGNOSIS — E11.9 DIABETES MELLITUS WITHOUT COMPLICATION (HCC): Primary | ICD-10-CM

## 2020-11-05 DIAGNOSIS — M54.41 ACUTE RIGHT-SIDED LOW BACK PAIN WITH RIGHT-SIDED SCIATICA: ICD-10-CM

## 2020-11-05 PROBLEM — Z13.220 SCREENING FOR CHOLESTEROL LEVEL: Status: RESOLVED | Noted: 2020-06-25 | Resolved: 2020-11-05

## 2020-11-05 PROBLEM — R09.82 POST-NASAL DRIP: Status: RESOLVED | Noted: 2020-06-25 | Resolved: 2020-11-05

## 2020-11-05 PROBLEM — Z11.4 ENCOUNTER FOR SCREENING FOR HIV: Status: RESOLVED | Noted: 2020-06-25 | Resolved: 2020-11-05

## 2020-11-05 PROBLEM — R07.9 CHEST PAIN: Status: RESOLVED | Noted: 2020-06-25 | Resolved: 2020-11-05

## 2020-11-05 PROBLEM — M54.40 ACUTE RIGHT-SIDED LOW BACK PAIN WITH SCIATICA: Status: ACTIVE | Noted: 2020-11-05

## 2020-11-05 PROBLEM — Z20.822 ENCOUNTER FOR LABORATORY TESTING FOR COVID-19 VIRUS: Status: RESOLVED | Noted: 2020-06-25 | Resolved: 2020-11-05

## 2020-11-05 PROCEDURE — 1036F TOBACCO NON-USER: CPT | Performed by: FAMILY MEDICINE

## 2020-11-05 PROCEDURE — 3725F SCREEN DEPRESSION PERFORMED: CPT | Performed by: FAMILY MEDICINE

## 2020-11-05 PROCEDURE — 99213 OFFICE O/P EST LOW 20 MIN: CPT | Performed by: FAMILY MEDICINE

## 2020-11-05 PROCEDURE — 3008F BODY MASS INDEX DOCD: CPT | Performed by: FAMILY MEDICINE

## 2020-12-22 DIAGNOSIS — E11.9 TYPE 2 DIABETES MELLITUS WITHOUT COMPLICATION, WITHOUT LONG-TERM CURRENT USE OF INSULIN (HCC): ICD-10-CM

## 2020-12-22 RX ORDER — EMPAGLIFLOZIN 10 MG/1
TABLET, FILM COATED ORAL
Qty: 90 TABLET | Refills: 2 | Status: SHIPPED | OUTPATIENT
Start: 2020-12-22 | End: 2021-05-07 | Stop reason: SDUPTHER

## 2021-04-29 ENCOUNTER — TELEPHONE (OUTPATIENT)
Dept: OBGYN CLINIC | Facility: HOSPITAL | Age: 51
End: 2021-04-29

## 2021-04-29 DIAGNOSIS — M72.2 PLANTAR FASCIITIS OF RIGHT FOOT: Primary | ICD-10-CM

## 2021-04-29 DIAGNOSIS — M72.2 PLANTAR FASCIITIS OF LEFT FOOT: ICD-10-CM

## 2021-04-29 DIAGNOSIS — M54.41 ACUTE RIGHT-SIDED LOW BACK PAIN WITH RIGHT-SIDED SCIATICA: ICD-10-CM

## 2021-04-29 RX ORDER — MELOXICAM 15 MG/1
15 TABLET ORAL DAILY
Qty: 30 TABLET | Refills: 0 | Status: SHIPPED | OUTPATIENT
Start: 2021-04-29 | End: 2021-05-27

## 2021-04-29 NOTE — PROGRESS NOTES
Spouse reports that patient has been having worsening of his plantar fasciitis pain with difficulty walking in the morning due to pain  Patient also been has been experiencing significant bilateral lower back pain  Therefore, we will start meloxicam 15 mg once daily advised to stop naproxen  Advised to take with food  Will refer to Podiatry stat for evaluation of his plantar fascitis and possible need of corticosteroid injection  Patient will follow-up with me in 1 month

## 2021-05-01 ENCOUNTER — HOSPITAL ENCOUNTER (OUTPATIENT)
Dept: RADIOLOGY | Facility: HOSPITAL | Age: 51
Discharge: HOME | End: 2021-05-01
Attending: FAMILY MEDICINE
Payer: COMMERCIAL

## 2021-05-01 DIAGNOSIS — M54.41 ACUTE RIGHT-SIDED LOW BACK PAIN WITH RIGHT-SIDED SCIATICA: ICD-10-CM

## 2021-05-01 PROCEDURE — 72110 X-RAY EXAM L-2 SPINE 4/>VWS: CPT

## 2021-05-05 ENCOUNTER — OFFICE VISIT (OUTPATIENT)
Dept: PODIATRY | Facility: CLINIC | Age: 51
End: 2021-05-05
Payer: COMMERCIAL

## 2021-05-05 VITALS
BODY MASS INDEX: 31.22 KG/M2 | SYSTOLIC BLOOD PRESSURE: 112 MMHG | HEIGHT: 69 IN | WEIGHT: 210.8 LBS | DIASTOLIC BLOOD PRESSURE: 77 MMHG | HEART RATE: 67 BPM

## 2021-05-05 DIAGNOSIS — M72.2 PLANTAR FASCIITIS OF LEFT FOOT: ICD-10-CM

## 2021-05-05 DIAGNOSIS — M72.2 PLANTAR FASCIITIS OF RIGHT FOOT: ICD-10-CM

## 2021-05-05 DIAGNOSIS — M79.671 RIGHT FOOT PAIN: Primary | ICD-10-CM

## 2021-05-05 PROCEDURE — 20550 NJX 1 TENDON SHEATH/LIGAMENT: CPT | Performed by: PODIATRIST

## 2021-05-05 PROCEDURE — 99242 OFF/OP CONSLTJ NEW/EST SF 20: CPT | Performed by: PODIATRIST

## 2021-05-05 PROCEDURE — 1036F TOBACCO NON-USER: CPT | Performed by: PODIATRIST

## 2021-05-05 RX ORDER — LIDOCAINE HYDROCHLORIDE 10 MG/ML
1 INJECTION, SOLUTION EPIDURAL; INFILTRATION; INTRACAUDAL; PERINEURAL ONCE
Status: COMPLETED | OUTPATIENT
Start: 2021-05-05 | End: 2021-05-05

## 2021-05-05 RX ORDER — TRIAMCINOLONE ACETONIDE 40 MG/ML
20 INJECTION, SUSPENSION INTRA-ARTICULAR; INTRAMUSCULAR ONCE
Status: COMPLETED | OUTPATIENT
Start: 2021-05-05 | End: 2021-05-05

## 2021-05-05 RX ORDER — BUPIVACAINE HYDROCHLORIDE 5 MG/ML
1 INJECTION, SOLUTION EPIDURAL; INTRACAUDAL ONCE
Status: COMPLETED | OUTPATIENT
Start: 2021-05-05 | End: 2021-05-05

## 2021-05-05 RX ADMIN — LIDOCAINE HYDROCHLORIDE 1 ML: 10 INJECTION, SOLUTION EPIDURAL; INFILTRATION; INTRACAUDAL; PERINEURAL at 10:04

## 2021-05-05 RX ADMIN — BUPIVACAINE HYDROCHLORIDE 1 ML: 5 INJECTION, SOLUTION EPIDURAL; INTRACAUDAL at 10:06

## 2021-05-05 RX ADMIN — TRIAMCINOLONE ACETONIDE 20 MG: 40 INJECTION, SUSPENSION INTRA-ARTICULAR; INTRAMUSCULAR at 10:04

## 2021-05-05 NOTE — PROGRESS NOTES
Assessment/Plan:      Explained the patient that his symptoms are consistent with plantar fasciitis of the right heel  Urged patient to refrain from walking barefoot  Dispensed heel inserts to maximize support  Stretching exercises were recommended  Injected right heel with 0 5 cc Kenalog 40 along with 1 cc 1% xylocaine and 1 cc 0 5% Marcaine  Patient to continue with the meloxicam     No problem-specific Assessment & Plan notes found for this encounter  Diagnoses and all orders for this visit:    Right foot pain    Plantar fasciitis of right foot  -     Ambulatory referral to Podiatry  -     bupivacaine (PF) (MARCAINE) 0 5 % injection 1 mL  -     lidocaine (PF) (XYLOCAINE-MPF) 1 % injection 1 mL  -     triamcinolone acetonide (KENALOG-40) 40 mg/mL injection 20 mg    Plantar fasciitis of left foot  -     Ambulatory referral to Podiatry          Subjective:      Patient ID: Elsa Rojas is a 45 y o  male  HPI       Patient, a 19-year-old   Type 2 diabetic Romansh-speaking male presents with an   His chief complaint is right heel pain of 6 month duration  No left heel pain is related  Patient has pain with each step  No trauma is noted  Patient had been given naproxen for his pain but was not getting much relief  He was recently switched to meloxicam but still has significant discomfort  Last A1c dated 10/08/2019 was 6 6  The following portions of the patient's history were reviewed and updated as appropriate: allergies, current medications, past family history, past medical history, past social history, past surgical history and problem list     Review of Systems   Cardiovascular: Negative  Gastrointestinal: Negative  Musculoskeletal: Negative  Neurological: Negative for numbness                   Objective:      /77   Pulse 67   Ht 5' 9" (1 753 m) Comment: verbal  Wt 95 6 kg (210 lb 12 8 oz)   BMI 31 13 kg/m²          Physical Exam  Constitutional:       Appearance: Normal appearance  Cardiovascular:      Pulses: Normal pulses  Musculoskeletal:         General: Tenderness present  Comments: Pain with palpation medial and central aspect right heel at fascia insertion into the calcaneus  Skin:     General: Skin is warm  Neurological:      General: No focal deficit present  Mental Status: He is oriented to person, place, and time  Comments: Tinel sign negative for tarsal tunnel syndrome  Foot injection     Date/Time 5/5/2021 10:11 AM     Performed by  Abdiaziz العلي DPM     Authorized by Abdiaziz العلي DPM      Universal Protocol   Consent: Verbal consent obtained  Risks and benefits: risks, benefits and alternatives were discussed  Consent given by: patient  Patient understanding: patient states understanding of the procedure being performed  Patient identity confirmed: verbally with patient        Local anesthesia used: yes     Anesthesia   Local anesthesia used: yes  Local Anesthetic: lidocaine 1% without epinephrine and bupivacaine 0 5% without epinephrine     Procedure Details   Procedure Notes:   Injected right heel with 0 5 cc Kenalog 40 along with 1 cc 1% xylocaine and 1 cc 0 5% Marcaine

## 2021-05-07 DIAGNOSIS — E11.9 TYPE 2 DIABETES MELLITUS WITHOUT COMPLICATION, WITHOUT LONG-TERM CURRENT USE OF INSULIN (HCC): ICD-10-CM

## 2021-05-07 RX ORDER — EMPAGLIFLOZIN 10 MG/1
10 TABLET, FILM COATED ORAL DAILY
Qty: 90 TABLET | Refills: 2 | Status: SHIPPED | OUTPATIENT
Start: 2021-05-07 | End: 2021-05-27 | Stop reason: SDUPTHER

## 2021-05-26 ENCOUNTER — OFFICE VISIT (OUTPATIENT)
Dept: PODIATRY | Facility: CLINIC | Age: 51
End: 2021-05-26
Payer: COMMERCIAL

## 2021-05-26 VITALS
HEIGHT: 69 IN | WEIGHT: 213 LBS | SYSTOLIC BLOOD PRESSURE: 120 MMHG | HEART RATE: 83 BPM | DIASTOLIC BLOOD PRESSURE: 75 MMHG | BODY MASS INDEX: 31.55 KG/M2

## 2021-05-26 DIAGNOSIS — M72.2 PLANTAR FASCIITIS OF RIGHT FOOT: Primary | ICD-10-CM

## 2021-05-26 PROCEDURE — 20550 NJX 1 TENDON SHEATH/LIGAMENT: CPT | Performed by: PODIATRIST

## 2021-05-26 RX ORDER — TRIAMCINOLONE ACETONIDE 40 MG/ML
20 INJECTION, SUSPENSION INTRA-ARTICULAR; INTRAMUSCULAR ONCE
Status: COMPLETED | OUTPATIENT
Start: 2021-05-26 | End: 2021-05-26

## 2021-05-26 RX ORDER — LIDOCAINE HYDROCHLORIDE 10 MG/ML
1 INJECTION, SOLUTION EPIDURAL; INFILTRATION; INTRACAUDAL; PERINEURAL ONCE
Status: COMPLETED | OUTPATIENT
Start: 2021-05-26 | End: 2021-05-26

## 2021-05-26 RX ORDER — BUPIVACAINE HYDROCHLORIDE 5 MG/ML
1 INJECTION, SOLUTION EPIDURAL; INTRACAUDAL ONCE
Status: COMPLETED | OUTPATIENT
Start: 2021-05-26 | End: 2021-05-26

## 2021-05-26 RX ADMIN — TRIAMCINOLONE ACETONIDE 20 MG: 40 INJECTION, SUSPENSION INTRA-ARTICULAR; INTRAMUSCULAR at 11:18

## 2021-05-26 RX ADMIN — LIDOCAINE HYDROCHLORIDE 1 ML: 10 INJECTION, SOLUTION EPIDURAL; INFILTRATION; INTRACAUDAL; PERINEURAL at 11:17

## 2021-05-26 RX ADMIN — BUPIVACAINE HYDROCHLORIDE 1 ML: 5 INJECTION, SOLUTION EPIDURAL; INTRACAUDAL at 11:17

## 2021-05-26 NOTE — PROGRESS NOTES
Patient presents for assessment of right heel  He is here with an   He relates significant improvement following the cortisone injection given at last visit  He rates his discomfort as a 3/10 but desires another injection  On exam, pain with palpation medial aspect right heel at fascia insertion into the calcaneus  Treatment:  Reinjected right heel with 0 5 cc Kenalog 40 along with 1 cc 1% xylocaine and 1 cc 0 5% Marcaine  Dispensed a pair of heel supports at his request           Foot injection     Date/Time 5/26/2021 11:17 AM     Performed by  Sveta Montez DPM     Authorized by Sveta Montez DPM      Universal Protocol   Consent: Verbal consent obtained  Risks and benefits: risks, benefits and alternatives were discussed  Consent given by: patient  Patient understanding: patient states understanding of the procedure being performed  Patient identity confirmed: verbally with patient        Local anesthesia used: yes     Anesthesia   Local anesthesia used: yes  Local Anesthetic: lidocaine 1% without epinephrine and bupivacaine 0 5% without epinephrine     Procedure Details   Procedure Notes:   Injected right heel with 0 5 cc Kenalog 40 along with 1 cc 1% xylocaine and 1 cc 0 5% Marcaine

## 2021-05-27 ENCOUNTER — TELEPHONE (OUTPATIENT)
Dept: FAMILY MEDICINE CLINIC | Facility: CLINIC | Age: 51
End: 2021-05-27

## 2021-05-27 ENCOUNTER — OFFICE VISIT (OUTPATIENT)
Dept: FAMILY MEDICINE CLINIC | Facility: CLINIC | Age: 51
End: 2021-05-27

## 2021-05-27 VITALS
OXYGEN SATURATION: 97 % | TEMPERATURE: 98.6 F | HEART RATE: 59 BPM | DIASTOLIC BLOOD PRESSURE: 78 MMHG | RESPIRATION RATE: 20 BRPM | HEIGHT: 69 IN | BODY MASS INDEX: 30.87 KG/M2 | WEIGHT: 208.4 LBS | SYSTOLIC BLOOD PRESSURE: 102 MMHG

## 2021-05-27 DIAGNOSIS — E11.9 DIABETES MELLITUS WITHOUT COMPLICATION (HCC): ICD-10-CM

## 2021-05-27 DIAGNOSIS — G89.29 CHRONIC BILATERAL LOW BACK PAIN WITHOUT SCIATICA: ICD-10-CM

## 2021-05-27 DIAGNOSIS — Z53.20 COLONOSCOPY REFUSED: ICD-10-CM

## 2021-05-27 DIAGNOSIS — E11.9 TYPE 2 DIABETES MELLITUS WITHOUT COMPLICATION, WITHOUT LONG-TERM CURRENT USE OF INSULIN (HCC): Primary | ICD-10-CM

## 2021-05-27 DIAGNOSIS — M72.2 PLANTAR FASCIITIS OF RIGHT FOOT: ICD-10-CM

## 2021-05-27 DIAGNOSIS — M54.50 CHRONIC BILATERAL LOW BACK PAIN WITHOUT SCIATICA: ICD-10-CM

## 2021-05-27 PROBLEM — M54.40 CHRONIC RIGHT-SIDED LOW BACK PAIN WITH SCIATICA: Status: RESOLVED | Noted: 2020-11-05 | Resolved: 2021-05-27

## 2021-05-27 LAB
LEFT EYE DIABETIC RETINOPATHY: NORMAL
LEFT EYE IMAGE QUALITY: NORMAL
LEFT EYE MACULAR EDEMA: NORMAL
LEFT EYE OTHER RETINOPATHY: NORMAL
RIGHT EYE DIABETIC RETINOPATHY: NORMAL
RIGHT EYE IMAGE QUALITY: NORMAL
RIGHT EYE MACULAR EDEMA: NORMAL
RIGHT EYE OTHER RETINOPATHY: NORMAL
SEVERITY (EYE EXAM): NORMAL
SL AMB POCT HEMOGLOBIN AIC: 7.2 (ref ?–6.5)

## 2021-05-27 PROCEDURE — 99214 OFFICE O/P EST MOD 30 MIN: CPT | Performed by: FAMILY MEDICINE

## 2021-05-27 PROCEDURE — 3008F BODY MASS INDEX DOCD: CPT | Performed by: FAMILY MEDICINE

## 2021-05-27 PROCEDURE — 3008F BODY MASS INDEX DOCD: CPT | Performed by: PODIATRIST

## 2021-05-27 PROCEDURE — 3051F HG A1C>EQUAL 7.0%<8.0%: CPT | Performed by: PODIATRIST

## 2021-05-27 PROCEDURE — 3051F HG A1C>EQUAL 7.0%<8.0%: CPT | Performed by: FAMILY MEDICINE

## 2021-05-27 PROCEDURE — 2025F 7 FLD RTA PHOTO W/O RTNOPTHY: CPT | Performed by: FAMILY MEDICINE

## 2021-05-27 PROCEDURE — 83036 HEMOGLOBIN GLYCOSYLATED A1C: CPT | Performed by: FAMILY MEDICINE

## 2021-05-27 PROCEDURE — 2025F 7 FLD RTA PHOTO W/O RTNOPTHY: CPT | Performed by: PODIATRIST

## 2021-05-27 PROCEDURE — 1036F TOBACCO NON-USER: CPT | Performed by: FAMILY MEDICINE

## 2021-05-27 RX ORDER — EMPAGLIFLOZIN 10 MG/1
10 TABLET, FILM COATED ORAL DAILY
Qty: 90 TABLET | Refills: 2 | Status: SHIPPED | OUTPATIENT
Start: 2021-05-27 | End: 2022-03-11

## 2021-05-27 NOTE — ASSESSMENT & PLAN NOTE
Lab Results   Component Value Date    HGBA1C 7 2 (A) 05/27/2021       - hemoglobin A1c ordered  -patient to continue Jardiance 10 mg once daily  - reports that fasting blood glucoses have been consistently under 120

## 2021-05-27 NOTE — ASSESSMENT & PLAN NOTE
- at the moment patient refuses colonoscopy stating that he had a diagnostic colonoscopy several years ago when he was having episodes of diarrhea  She reports that the moment he would like to wait before obtaining another 1

## 2021-05-27 NOTE — ASSESSMENT & PLAN NOTE
Logan Solorio reviewed   - Patient refuses PT  - Continue supportive care  - Will discuss further tx on follow up visit

## 2021-05-27 NOTE — PROGRESS NOTES
Assessment/Plan:    Plantar fasciitis of right foot  - Following with podiatry    Diabetes mellitus without complication ([facility] [address] )    Lab Results   Component Value Date    HGBA1C 7 2 (A) 05/27/2021       - hemoglobin A1c ordered  -patient to continue Jardiance 10 mg once daily  - reports that fasting blood glucoses have been consistently under 120  Colonoscopy refused  - at the moment patient refuses colonoscopy stating that he had a diagnostic colonoscopy several years ago when he was having episodes of diarrhea  She reports that the moment he would like to wait before obtaining another 1  Chronic bilateral low back pain without sciatica  - Xray reviewed   - Patient refuses PT  - Continue supportive care  - Will discuss further tx on follow up visit       Diagnoses and all orders for this visit:    Type 2 diabetes mellitus without complication, without long-term current use of insulin (HCC)  -     POCT hemoglobin A1c  -     IRIS Diabetic eye exam  -     Jardiance 10 MG TABS; Take 1 tablet (10 mg total) by mouth daily    Diabetes mellitus without complication (HCC)    Plantar fasciitis of right foot    Colonoscopy refused    Chronic bilateral low back pain without sciatica          Subjective:      Patient ID: Sujit Madera is a 45 y o  male  Pleasant patient 69-year-old male with past medical his type 2 diabetes mellitus very well controlled presents the office today for follow-up visit of his chronic conditions  Patient reports that he continues to have back pain is intermittent and located bilaterally close to his piriformis muscles (Please see below for rest)  Patient reports that he has not received his Jardiance from the pharmacy and has not been taking his medication in about a month  No other complaints  Back Pain  This is a recurrent problem  The current episode started more than 1 month ago  The problem occurs daily  The problem has been gradually improving since onset   The pain is present in the gluteal and lumbar spine  The quality of the pain is described as aching  The pain radiates to the right thigh, right foot and right knee  The pain is at a severity of 0/10  The patient is experiencing no pain  The symptoms are aggravated by bending, position and twisting  Pertinent negatives include no abdominal pain, bladder incontinence, bowel incontinence, chest pain, dysuria, fever, headaches, leg pain, numbness, paresis, paresthesias, pelvic pain, perianal numbness, tingling, weakness or weight loss  Risk factors include sedentary lifestyle, obesity and poor posture  He has tried NSAIDs for the symptoms  The treatment provided no relief  The following portions of the patient's history were reviewed and updated as appropriate:   He  has a past medical history of Anxiety and Depression  He   Patient Active Problem List    Diagnosis Date Noted    Chronic bilateral low back pain without sciatica 05/27/2021    Plantar fasciitis of right foot 04/30/2020    Colonoscopy refused 06/20/2019    [facility] 17/57/[facility]661     He  has a past surgical history that includes Cholecystectomy (2007) and Hernia repair (2006)  His family history includes Cancer in his maternal uncle; Diabetes in his mother; Hypertension in his maternal grandfather; Nephrolithiasis in his family; Osteoarthritis in his maternal grandmother  He  reports that he has never smoked  He has never used smokeless tobacco  He reports previous alcohol use  He reports that he does not use drugs    Current Outpatient Medications   Medication Sig Dispense Refill    Alcohol Swabs 70 % PADS As directed      Blood Glucose Monitoring Suppl (FREESTYLE LITE) FRANCIS by Other route daily 1 each 0    fluticasone (FLONASE) 50 mcg/act nasal spray 1 spray into each nostril daily 1 Bottle 1    glucose blood (FREESTYLE LITE) test strip TEST TWICE DAILY 100 each 5    Jardiance 10 MG TABS Take 1 tablet (10 mg total) by mouth daily 90 tablet 2    Lancets (FREESTYLE) lancets by Other route daily Use as instructed 100 each 5     No current facility-administered medications for this visit       Review of Systems   Constitutional: Negative for fatigue, fever and weight loss  HENT: Negative for congestion and sore throat  Eyes: Negative for visual disturbance  Respiratory: Negative for cough, shortness of breath and wheezing  Cardiovascular: Negative for chest pain, palpitations and leg swelling  Gastrointestinal: Negative for abdominal pain, anal bleeding, bowel incontinence, constipation, diarrhea, nausea and vomiting  Endocrine: Negative for cold intolerance and heat intolerance  Genitourinary: Negative for bladder incontinence, dysuria and pelvic pain  Musculoskeletal: Positive for back pain  Negative for arthralgias and joint swelling  Skin: Negative for color change  Neurological: Negative for dizziness, tingling, seizures, syncope, weakness, light-headedness, numbness, headaches and paresthesias  Psychiatric/Behavioral: Negative for agitation, confusion, sleep disturbance and suicidal ideas  Objective:      /78 (BP Location: Left arm, Patient Position: Sitting, Cuff Size: Standard)   Pulse 59   Temp 98 6 °F (37 °C) (Temporal)   Resp 20   Ht 5' 9" (1 753 m)   Wt 94 5 kg (208 lb 6 4 oz)   SpO2 97%   BMI 30 78 kg/m²          Physical Exam  Constitutional:       Appearance: He is well-developed  HENT:      Head: Normocephalic and atraumatic  Eyes:      Conjunctiva/sclera: Conjunctivae normal       Pupils: Pupils are equal, round, and reactive to light  Neck:      Musculoskeletal: Normal range of motion and neck supple  Vascular: No JVD  Cardiovascular:      Rate and Rhythm: Normal rate and regular rhythm  Heart sounds: Normal heart sounds  No murmur  No friction rub  No gallop  Pulmonary:      Effort: Pulmonary effort is normal  No respiratory distress        Breath sounds: Normal breath sounds  No wheezing  Abdominal:      General: Bowel sounds are normal  There is no distension  Palpations: Abdomen is soft  Tenderness: There is no abdominal tenderness  Musculoskeletal: Normal range of motion  Skin:     General: Skin is warm and dry  Neurological:      Mental Status: He is alert and oriented to person, place, and time  Deep Tendon Reflexes: Reflexes are normal and symmetric  Psychiatric:         Behavior: Behavior normal          Thought Content: Thought content normal          Judgment: Judgment normal          BMI Counseling: Body mass index is 30 78 kg/m²  The BMI is above normal  Nutrition recommendations include reducing portion sizes, decreasing overall calorie intake and 3-5 servings of fruits/vegetables daily  Exercise recommendations include moderate aerobic physical activity for 150 minutes/week

## 2021-09-09 ENCOUNTER — OFFICE VISIT (OUTPATIENT)
Dept: FAMILY MEDICINE CLINIC | Facility: CLINIC | Age: 51
End: 2021-09-09

## 2021-09-09 VITALS
RESPIRATION RATE: 18 BRPM | HEIGHT: 69 IN | DIASTOLIC BLOOD PRESSURE: 72 MMHG | HEART RATE: 59 BPM | WEIGHT: 213.5 LBS | OXYGEN SATURATION: 97 % | BODY MASS INDEX: 31.62 KG/M2 | TEMPERATURE: 96.8 F | SYSTOLIC BLOOD PRESSURE: 100 MMHG

## 2021-09-09 DIAGNOSIS — Z13.220 SCREENING FOR CHOLESTEROL LEVEL: ICD-10-CM

## 2021-09-09 DIAGNOSIS — Z11.59 NEED FOR HEPATITIS C SCREENING TEST: ICD-10-CM

## 2021-09-09 DIAGNOSIS — Z11.4 SCREENING FOR HIV (HUMAN IMMUNODEFICIENCY VIRUS): ICD-10-CM

## 2021-09-09 DIAGNOSIS — E11.9 DIABETES MELLITUS WITHOUT COMPLICATION (HCC): Primary | ICD-10-CM

## 2021-09-09 DIAGNOSIS — M75.42 IMPINGEMENT SYNDROME OF LEFT SHOULDER: ICD-10-CM

## 2021-09-09 DIAGNOSIS — G89.29 CHRONIC BILATERAL LOW BACK PAIN WITHOUT SCIATICA: ICD-10-CM

## 2021-09-09 DIAGNOSIS — M54.50 CHRONIC BILATERAL LOW BACK PAIN WITHOUT SCIATICA: ICD-10-CM

## 2021-09-09 PROBLEM — M25.512 CHRONIC LEFT SHOULDER PAIN: Status: ACTIVE | Noted: 2021-09-09

## 2021-09-09 PROBLEM — M25.512 CHRONIC LEFT SHOULDER PAIN: Status: RESOLVED | Noted: 2021-09-09 | Resolved: 2021-09-09

## 2021-09-09 LAB — SL AMB POCT HEMOGLOBIN AIC: 7.5 (ref ?–6.5)

## 2021-09-09 PROCEDURE — 83036 HEMOGLOBIN GLYCOSYLATED A1C: CPT | Performed by: FAMILY MEDICINE

## 2021-09-09 PROCEDURE — 99214 OFFICE O/P EST MOD 30 MIN: CPT | Performed by: FAMILY MEDICINE

## 2021-09-09 PROCEDURE — 20610 DRAIN/INJ JOINT/BURSA W/O US: CPT | Performed by: FAMILY MEDICINE

## 2021-09-09 RX ORDER — LIDOCAINE HYDROCHLORIDE 10 MG/ML
3 INJECTION, SOLUTION INFILTRATION; PERINEURAL
Status: COMPLETED | OUTPATIENT
Start: 2021-09-09 | End: 2021-09-09

## 2021-09-09 RX ORDER — TRIAMCINOLONE ACETONIDE 40 MG/ML
80 INJECTION, SUSPENSION INTRA-ARTICULAR; INTRAMUSCULAR
Status: COMPLETED | OUTPATIENT
Start: 2021-09-09 | End: 2021-09-09

## 2021-09-09 RX ADMIN — TRIAMCINOLONE ACETONIDE 80 MG: 40 INJECTION, SUSPENSION INTRA-ARTICULAR; INTRAMUSCULAR at 09:53

## 2021-09-09 RX ADMIN — LIDOCAINE HYDROCHLORIDE 3 ML: 10 INJECTION, SOLUTION INFILTRATION; PERINEURAL at 09:53

## 2021-09-09 NOTE — ASSESSMENT & PLAN NOTE
- Patient received a corticosteroid injection today of 3ml of 2% lidocaine and 2ml of 40mg of kenalog  He tolerated procedure well  - Explained to patient that he may feel more pain once the anesthetic wears off and if so he can apply ice to the affected area  Pain will subside once the steroid begins to work in about 24hrs  - Explained to patient that his sugar may rise for the next couple days  Advised to continue taking his medication as prescribed

## 2021-09-09 NOTE — PROGRESS NOTES
Assessment/Plan:    Impingement syndrome of left shoulder  - Patient received a corticosteroid injection today of 3ml of 2% lidocaine and 2ml of 40mg of kenalog  He tolerated procedure well  - Explained to patient that he may feel more pain once the anesthetic wears off and if so he can apply ice to the affected area  Pain will subside once the steroid begins to work in about 24hrs  - Explained to patient that his sugar may rise for the next couple days  Advised to continue taking his medication as prescribed  Diabetes mellitus without complication ([facility] )    Lab Results   Component Value Date    HGBA1C 7 5 (A) 09/09/2021       - patient to continue Jardiance 10 mg once daily  - advised to decrease the consumption of processed carbohydrates  Chronic bilateral low back pain without sciatica  - xray reviewed  - No improvement on NSAIDs or tylenol  - Advised patient he should consider PT  - Will discuss further treatment on follow up visit  Diagnoses and all orders for this visit:    Diabetes mellitus without complication ([facility] )  -     POCT hemoglobin A1c  -     Microalbumin / creatinine urine ratio    Screening for HIV (human immunodeficiency virus)  -     HIV 1/2 Antigen/Antibody (4th Generation) w Reflex [facility]; Future    Need for hepatitis C screening test  -     Hepatitis C Antibody ([facility]); Future    Screening for cholesterol level  -     Lipid panel; Future    Impingement syndrome of left shoulder  -     Large joint arthrocentesis: L subacromial bursa  -     lidocaine (XYLOCAINE) 1 % injection 3 mL  -     triamcinolone acetonide (KENALOG-40) 40 mg/mL injection 80 mg    Chronic bilateral low back pain without sciatica      Large joint arthrocentesis: L subacromial bursa  Universal Protocol:  Consent: Verbal consent obtained  Written consent not obtained    Risks and benefits: risks, benefits and alternatives were discussed  Consent given by: patient  Time out: Immediately prior to procedure a "time out" was called to verify the correct patient, procedure, equipment, support staff and site/side marked as required  Timeout called at: 9/9/2021 9:00 AM   Patient understanding: patient does not state understanding of the procedure being performed  Patient consent: the patient's understanding of the procedure does not match consent given  Procedure consent: procedure consent does not match procedure scheduled  Relevant documents: relevant documents not present or verified  Test results: test results not available  Site marked: the operative site was not marked  Radiology Images displayed and confirmed  If images not available, report reviewed: imaging studies not available  Patient identity confirmed: verbally with patient    Supporting Documentation  Indications: pain   Procedure Details  Location: shoulder - L subacromial bursa  Preparation: Patient was prepped and draped in the usual sterile fashion  Needle size: 25 G  Ultrasound guidance: no  Approach: posterolateral  Medications administered: 3 mL lidocaine 1 %; 80 mg triamcinolone acetonide 40 mg/mL    Patient tolerance: patient tolerated the procedure well with no immediate complications  Dressing:  Sterile dressing applied          Subjective:      Patient ID: Arne Lesches is a 45 y o  male  Pleasant 58-year-old male with history of type 2 diabetes here for shoulder injection  Shoulder Pain   The pain is present in the left shoulder  This is a recurrent problem  The current episode started in the past 7 days  There has been no history of extremity trauma  The problem occurs constantly  The problem has been unchanged  The quality of the pain is described as aching  The pain is at a severity of 10/10  The pain is severe  Associated symptoms include stiffness  Pertinent negatives include no fever, inability to bear weight, itching, joint locking, joint swelling, limited range of motion or tingling   The symptoms are aggravated by activity and lying down  He has tried rest and acetaminophen for the symptoms  The treatment provided no relief  Family history does not include gout or rheumatoid arthritis  There is no history of diabetes, gout, osteoarthritis or rheumatoid arthritis  The following portions of the patient's history were reviewed and updated as appropriate:   He  has a past medical history of Anxiety and Depression  He   Patient Active Problem List    Diagnosis Date Noted    Chronic bilateral low back pain without sciatica 05/27/2021    Plantar fasciitis of right foot 04/30/2020    Colonoscopy refused 06/20/2019    Impingement syndrome of left shoulder 06/10/2019    Diabetes mellitus without complication ([facility] [address] ) 02/82/0999     He  has a past surgical history that includes Cholecystectomy (2007) and Hernia repair (2006)  His family history includes Cancer in his maternal uncle; Diabetes in his mother; Hypertension in his maternal grandfather; Nephrolithiasis in his family; Osteoarthritis in his maternal grandmother  He  reports that he has never smoked  He has never used smokeless tobacco  He reports previous alcohol use  He reports that he does not use drugs  Current Outpatient Medications   Medication Sig Dispense Refill    Alcohol Swabs 70 % PADS As directed      Blood Glucose Monitoring Suppl (FREESTYLE LITE) FRANCIS by Other route daily 1 each 0    fluticasone (FLONASE) 50 mcg/act nasal spray 1 spray into each nostril daily 1 Bottle 1    glucose blood (FREESTYLE LITE) test strip TEST TWICE DAILY 100 each 5    Jardiance 10 MG TABS Take 1 tablet (10 mg total) by mouth daily 90 tablet 2    Lancets (FREESTYLE) lancets by Other route daily Use as instructed 100 each 5     No current facility-administered medications for this visit       Review of Systems   Constitutional: Negative for fatigue and fever  HENT: Negative for congestion and sore throat  Eyes: Negative for visual disturbance     Respiratory: Negative for cough, shortness of breath and wheezing  Cardiovascular: Negative for chest pain, palpitations and leg swelling  Gastrointestinal: Negative for abdominal pain, anal bleeding, constipation, diarrhea, nausea and vomiting  Endocrine: Negative for cold intolerance and heat intolerance  Musculoskeletal: Positive for arthralgias, back pain and stiffness  Negative for gout and joint swelling  Skin: Negative for color change and itching  Neurological: Negative for dizziness, tingling, seizures, syncope, weakness and light-headedness  Psychiatric/Behavioral: Negative for agitation, confusion, sleep disturbance and suicidal ideas  Objective:      /72 (BP Location: Left arm, Patient Position: Sitting, Cuff Size: Standard)   Pulse 59   Temp (!) 96 8 °F (36 °C) (Temporal)   Resp 18   Ht 5' 9" (1 753 m)   Wt 96 8 kg (213 lb 8 oz)   SpO2 97%   BMI 31 53 kg/m²          Physical Exam  Constitutional:       General: He is not in acute distress  Appearance: Normal appearance  He is obese  He is not ill-appearing  HENT:      Head: Normocephalic and atraumatic  Right Ear: External ear normal       Left Ear: External ear normal       Nose: Nose normal  No congestion  Eyes:      Extraocular Movements: Extraocular movements intact  Conjunctiva/sclera: Conjunctivae normal       Pupils: Pupils are equal, round, and reactive to light  Pulmonary:      Effort: Pulmonary effort is normal  No respiratory distress  Musculoskeletal:      Left upper arm: Tenderness present  No swelling, edema, deformity, lacerations or bony tenderness  Cervical back: Normal range of motion  No rigidity  Comments: - negative Talisha Richard test  - negative Neer's test  - positive lift-off test   Neurological:      General: No focal deficit present  Mental Status: He is alert  Mental status is at baseline  -NPO  -Tele monitoring  -Ambulate as tolerated

## 2021-09-09 NOTE — ASSESSMENT & PLAN NOTE
- xray reviewed  - No improvement on NSAIDs or tylenol  - Advised patient he should consider PT  - Will discuss further treatment on follow up visit

## 2021-09-09 NOTE — ASSESSMENT & PLAN NOTE
Lab Results   Component Value Date    HGBA1C 7 5 (A) 09/09/2021       - patient to continue Jardiance 10 mg once daily  - advised to decrease the consumption of processed carbohydrates

## 2021-09-15 ENCOUNTER — OFFICE VISIT (OUTPATIENT)
Dept: FAMILY MEDICINE CLINIC | Facility: CLINIC | Age: 51
End: 2021-09-15

## 2021-09-15 VITALS
SYSTOLIC BLOOD PRESSURE: 118 MMHG | HEIGHT: 69 IN | BODY MASS INDEX: 30.85 KG/M2 | DIASTOLIC BLOOD PRESSURE: 72 MMHG | WEIGHT: 208.3 LBS | TEMPERATURE: 98 F | HEART RATE: 68 BPM | RESPIRATION RATE: 18 BRPM | OXYGEN SATURATION: 99 %

## 2021-09-15 DIAGNOSIS — Z00.00 ANNUAL PHYSICAL EXAM: ICD-10-CM

## 2021-09-15 DIAGNOSIS — M54.50 CHRONIC BILATERAL LOW BACK PAIN WITHOUT SCIATICA: Primary | ICD-10-CM

## 2021-09-15 DIAGNOSIS — G89.29 CHRONIC BILATERAL LOW BACK PAIN WITHOUT SCIATICA: Primary | ICD-10-CM

## 2021-09-15 PROCEDURE — 99396 PREV VISIT EST AGE 40-64: CPT | Performed by: FAMILY MEDICINE

## 2021-09-15 NOTE — ASSESSMENT & PLAN NOTE
Jaja Perdomo reviewed   - Patient refuses PT  - Continue supportive care  - Referral made to pain management for further evaluation

## 2021-09-15 NOTE — PROGRESS NOTES
Assessment/Plan:    Chronic bilateral low back pain without sciatica  - Xray reviewed   - Patient refuses PT  - Continue supportive care  - Referral made to pain management for further evaluation    Annual physical exam  UTD with vaccines except for flu which is pending for later in the year  Needs colonoscopy however declines doing it  Diagnoses and all orders for this visit:    Chronic bilateral low back pain without sciatica  -     Ambulatory referral to Pain Management; Future    Annual physical exam    Other orders  -     Cancel: Ambulatory referral to Pain Management; Future          Subjective:      Patient ID: Halie Castillo is a 45 y o  male  This is a pleasant 51yr old M with PMH of diabetes type 2 who comes into the office today for an annnual physical and to discuss his intermittent chronic back pain  Back Pain  This is a recurrent problem  The current episode started more than 1 year ago  The problem occurs intermittently  The problem is unchanged  The pain is present in the lumbar spine  The quality of the pain is described as aching  The pain radiates to the left thigh and right thigh  Pain scale: currently has no pain  The patient is experiencing no pain  Stiffness is present all day  Associated symptoms include leg pain  Pertinent negatives include no abdominal pain, bladder incontinence, bowel incontinence, chest pain, dysuria, fever, headaches, numbness, paresis, paresthesias, pelvic pain, perianal numbness, tingling, weakness or weight loss  Risk factors include lack of exercise, obesity, sedentary lifestyle and poor posture  He has tried NSAIDs and analgesics for the symptoms  The treatment provided no relief  The following portions of the patient's history were reviewed and updated as appropriate:   He  has a past medical history of Anxiety and Depression    He   Patient Active Problem List    Diagnosis Date Noted    Annual physical exam 09/15/2021    Chronic bilateral low back pain without sciatica 05/27/2021    Plantar fasciitis of right foot 04/30/2020    Colonoscopy refused 06/20/2019    Impingement syndrome of left shoulder 06/10/2019    [facility] 42/[birth year]/1978     He  has a past surgical history that includes Cholecystectomy (2007) and Hernia repair (2006)  His family history includes Cancer in his maternal uncle; Diabetes in his mother; Hypertension in his maternal grandfather; Nephrolithiasis in his family; Osteoarthritis in his maternal grandmother  He  reports that he has never smoked  He has never used smokeless tobacco  He reports previous alcohol use  He reports that he does not use drugs  Current Outpatient Medications   Medication Sig Dispense Refill    Alcohol Swabs 70 % PADS As directed      Blood Glucose Monitoring Suppl (FREESTYLE LITE) FRANCIS by Other route daily 1 each 0    fluticasone (FLONASE) 50 mcg/act nasal spray 1 spray into each nostril daily 1 Bottle 1    glucose blood (FREESTYLE LITE) test strip TEST TWICE DAILY 100 each 5    Jardiance 10 MG TABS Take 1 tablet (10 mg total) by mouth daily 90 tablet 2    Lancets (FREESTYLE) lancets by Other route daily Use as instructed 100 each 5     No current facility-administered medications for this visit       Review of Systems   Constitutional: Negative for fatigue, fever and weight loss  HENT: Negative for congestion and sore throat  Eyes: Negative for visual disturbance  Respiratory: Negative for cough, shortness of breath and wheezing  Cardiovascular: Negative for chest pain, palpitations and leg swelling  Gastrointestinal: Negative for abdominal pain, anal bleeding, bowel incontinence, constipation, diarrhea, nausea and vomiting  Endocrine: Negative for cold intolerance and heat intolerance  Genitourinary: Negative for bladder incontinence, dysuria and pelvic pain  Musculoskeletal: Positive for back pain   Negative for arthralgias and joint swelling  Skin: Negative for color change  Neurological: Negative for dizziness, tingling, seizures, syncope, weakness, light-headedness, numbness, headaches and paresthesias  Psychiatric/Behavioral: Negative for agitation, confusion, sleep disturbance and suicidal ideas  Objective:      /72 (BP Location: Left arm, Patient Position: Sitting, Cuff Size: Standard)   Pulse 68   Temp 98 °F (36 7 °C) (Temporal)   Resp 18   Ht 5' 9" (1 753 m)   Wt 94 5 kg (208 lb 4 8 oz)   SpO2 99%   BMI 30 76 kg/m²          Physical Exam  Constitutional:       Appearance: He is well-developed  HENT:      Head: Normocephalic and atraumatic  Eyes:      Conjunctiva/sclera: Conjunctivae normal       Pupils: Pupils are equal, round, and reactive to light  Neck:      Vascular: No JVD  Cardiovascular:      Rate and Rhythm: Normal rate and regular rhythm  Heart sounds: Normal heart sounds  No murmur heard  No friction rub  No gallop  Pulmonary:      Effort: Pulmonary effort is normal  No respiratory distress  Breath sounds: Normal breath sounds  No wheezing  Abdominal:      General: Bowel sounds are normal  There is no distension  Palpations: Abdomen is soft  Tenderness: There is no abdominal tenderness  Musculoskeletal:         General: Normal range of motion  Cervical back: Normal range of motion and neck supple  Lumbar back: Normal    Skin:     General: Skin is warm and dry  Neurological:      Mental Status: He is alert and oriented to person, place, and time  Deep Tendon Reflexes: Reflexes are normal and symmetric  Psychiatric:         Behavior: Behavior normal          Thought Content:  Thought content normal          Judgment: Judgment normal

## 2021-09-15 NOTE — ASSESSMENT & PLAN NOTE
UTD with vaccines except for flu which is pending for later in the year  Needs colonoscopy however declines doing it

## 2021-10-26 ENCOUNTER — VBI (OUTPATIENT)
Dept: ADMINISTRATIVE | Facility: OTHER | Age: 51
End: 2021-10-26

## 2021-11-22 ENCOUNTER — CONSULT (OUTPATIENT)
Dept: PAIN MEDICINE | Facility: MEDICAL CENTER | Age: 51
End: 2021-11-22
Payer: COMMERCIAL

## 2021-11-22 VITALS
WEIGHT: 212 LBS | TEMPERATURE: 98 F | BODY MASS INDEX: 31.4 KG/M2 | DIASTOLIC BLOOD PRESSURE: 80 MMHG | SYSTOLIC BLOOD PRESSURE: 118 MMHG | HEIGHT: 69 IN | HEART RATE: 65 BPM

## 2021-11-22 DIAGNOSIS — M54.50 CHRONIC BILATERAL LOW BACK PAIN WITHOUT SCIATICA: Primary | ICD-10-CM

## 2021-11-22 DIAGNOSIS — M47.816 LUMBAR SPONDYLOSIS: ICD-10-CM

## 2021-11-22 DIAGNOSIS — G89.29 CHRONIC LEFT SHOULDER PAIN: Primary | ICD-10-CM

## 2021-11-22 DIAGNOSIS — G89.29 CHRONIC BILATERAL LOW BACK PAIN WITHOUT SCIATICA: Primary | ICD-10-CM

## 2021-11-22 DIAGNOSIS — M25.512 CHRONIC LEFT SHOULDER PAIN: Primary | ICD-10-CM

## 2021-11-22 PROCEDURE — 99244 OFF/OP CNSLTJ NEW/EST MOD 40: CPT | Performed by: PHYSICAL MEDICINE & REHABILITATION

## 2021-11-22 PROCEDURE — 1036F TOBACCO NON-USER: CPT | Performed by: PHYSICAL MEDICINE & REHABILITATION

## 2021-11-22 PROCEDURE — 3008F BODY MASS INDEX DOCD: CPT | Performed by: PHYSICAL MEDICINE & REHABILITATION

## 2021-11-22 RX ORDER — DULOXETIN HYDROCHLORIDE 30 MG/1
30 CAPSULE, DELAYED RELEASE ORAL DAILY
Qty: 30 CAPSULE | Refills: 1 | Status: SHIPPED | OUTPATIENT
Start: 2021-11-22 | End: 2022-05-18 | Stop reason: ALTCHOICE

## 2021-12-08 ENCOUNTER — TELEPHONE (OUTPATIENT)
Dept: RADIOLOGY | Facility: MEDICAL CENTER | Age: 51
End: 2021-12-08

## 2022-03-11 DIAGNOSIS — E11.9 TYPE 2 DIABETES MELLITUS WITHOUT COMPLICATION, WITHOUT LONG-TERM CURRENT USE OF INSULIN (HCC): ICD-10-CM

## 2022-03-11 RX ORDER — EMPAGLIFLOZIN 10 MG/1
TABLET, FILM COATED ORAL
Qty: 90 TABLET | Refills: 2 | Status: SHIPPED | OUTPATIENT
Start: 2022-03-11 | End: 2022-06-08 | Stop reason: SDUPTHER

## 2022-04-20 ENCOUNTER — OFFICE VISIT (OUTPATIENT)
Dept: DENTISTRY | Facility: CLINIC | Age: 52
End: 2022-04-20

## 2022-04-20 VITALS — TEMPERATURE: 98 F | DIASTOLIC BLOOD PRESSURE: 81 MMHG | HEART RATE: 69 BPM | SYSTOLIC BLOOD PRESSURE: 116 MMHG

## 2022-04-20 DIAGNOSIS — K02.9 DENTAL CARIES: Primary | ICD-10-CM

## 2022-04-20 PROCEDURE — D0140 LIMITED ORAL EVALUATION - PROBLEM FOCUSED: HCPCS | Performed by: DENTIST

## 2022-04-20 PROCEDURE — D0272 BITEWINGS - 2 RADIOGRAPHIC IMAGES: HCPCS

## 2022-04-20 PROCEDURE — D0230 INTRAORAL - PERIAPICAL EACH ADDITIONAL RADIOGRAPHIC IMAGE: HCPCS

## 2022-04-20 PROCEDURE — D0220 INTRAORAL - PERIAPICAL FIRST RADIOGRAPHIC IMAGE: HCPCS

## 2022-04-20 NOTE — PROGRESS NOTES
Pt presents to dental clinic as an emergency with chief complaint of non stop throbbing pain at night  Reviewed medical history  ASA Class II    Pt states pain has been going on for a week  Tooth is sensitive to hots and colds  Wakes patient at night  PA taken reveals large distal carious lesion with periapical pathology present  Intraoral exam reveals large carious lesion present  #13 is ++ to percussion, ++ to palpation  Pt has buccal exostosis  Pt wife stated he has had a bad experience with dentist and was anxious even when provider picked up an explorer  Pt was eased and cooperated with exam     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  Gave patient option:  1  Extraction  2  RCT, post, core crown, possible CL  Pt was advised prognosis is guarded due to the subg caries present  Tooth will require multiple visits, time, and finances to accomplish goal      Pt asked about sedation for the extraction, advise pt we do not do sedation but can offer nitrous  Would have to refer patient out to oral surgeon for extraction  Pt would like to place RCT through insurance and to see the result  Advised pt that can bring patient back for palliative treatment to include pulpectomy in order to alleviate patient of pain and to await the response from insurance  Pt agrees       NV- #13 pulpectomy

## 2022-04-20 NOTE — PROGRESS NOTES
Dental procedures in this visit     - LIMITED ORAL EVALUATION - PROBLEM FOCUSED (Completed)     Service provider: Say Diez DMD     Billing provider: Say Diez DMD     - BITEWINGS - 2 RADIOGRAPHIC IMAGES (Completed)     [facility] [address] [place] [address]     Billing provider: Say Diez DMD     - INTRAORAL - PERIAPICAL FIRST RADIOGRAPHIC IMAGE 12 (Completed)     [facility] [address] [place] [address]     Billing provider: Say Diez DMD     - INTRAORAL - PERIAPICAL EACH ADDITIONAL RADIOGRAPHIC IMAGE 20 (Completed)     [facility] [address] [place] [address]     Billing provider: Say Diez DMD     - INTRAORAL - PERIAPICAL EACH ADDITIONAL RADIOGRAPHIC IMAGE 24 (Completed)     [facility] [address] [place] [address]     Billing provider: Say Diez DMD     - INTRAORAL - PERIAPICAL EACH ADDITIONAL RADIOGRAPHIC IMAGE 9 (Completed)     [facility] [address] [place] [address]     Billing provider: Say Diez DMD     Subjective   Patient ID: Stephen Ross is a 45 y o  male  Chief Complaint   Patient presents with    Emergency/limited Exam     ASA 2  Patient presented with wife for limited exam/ emergency appt  Patient very apprehensive about dental work  States pain is on left side upper and lower    2 BWX and PAX taken to assess problem  HPI  The following portions of the chart were reviewed this encounter and updated as appropriate:    No text in SmartText           Objective   Dental Soft Tissue Exam   Dental Exam    Radiographic Interpretation:   Associated radiographs for today's visit were reviewed and finding(s) were discussed with the patient     Findings include:  Deep decay # 13   Radiographic calculus noted  Hard Tissue Exam:  Decay noted - see charting and treatment plan     EXAM  Dr Molly Jung Dr discussed treatment options with patient for # 13   He is interested in seeing if insurance will cover RCT  We ill schedule palliative pulpotomy to get him out of pain  Encouraged him to return for comp  Exam and remaining radiographs    Assessment/Plan   Visit Dx (refresh)   Encounter Diagnosis   Name Primary?     Dental caries Yes

## 2022-04-28 ENCOUNTER — TELEPHONE (OUTPATIENT)
Dept: FAMILY MEDICINE CLINIC | Facility: CLINIC | Age: 52
End: 2022-04-28

## 2022-04-28 DIAGNOSIS — K59.00 CONSTIPATION, UNSPECIFIED CONSTIPATION TYPE: Primary | ICD-10-CM

## 2022-04-28 RX ORDER — DOCUSATE SODIUM 100 MG/1
100 CAPSULE, LIQUID FILLED ORAL 2 TIMES DAILY
Qty: 30 CAPSULE | Refills: 0 | Status: SHIPPED | OUTPATIENT
Start: 2022-04-28 | End: 2022-06-08

## 2022-04-28 NOTE — TELEPHONE ENCOUNTER
Patients wife called stating patient is very constipated and has not gone to the bathroom in several days  Advised patient drink plenty of fluids and will send colace for patient to take twice daily until stool softens

## 2022-04-29 ENCOUNTER — OFFICE VISIT (OUTPATIENT)
Dept: DENTISTRY | Facility: CLINIC | Age: 52
End: 2022-04-29

## 2022-04-29 VITALS — TEMPERATURE: 97.5 F | SYSTOLIC BLOOD PRESSURE: 126 MMHG | HEART RATE: 79 BPM | DIASTOLIC BLOOD PRESSURE: 81 MMHG

## 2022-04-29 DIAGNOSIS — K02.9 CARIES: Primary | ICD-10-CM

## 2022-04-29 PROCEDURE — D9110 PALLIATIVE (EMERGENCY) TREATMENT OF DENTAL PAIN - MINOR PROCEDURE: HCPCS | Performed by: DENTIST

## 2022-04-29 NOTE — PROGRESS NOTES
Palliative endo    Art Garcia presents for palliative endo #13 to alleviate pain  PMH reviewed, no changes  I/O exam shows parulis lesion on buccal gingiva near apex of #13  Dx: irreversible pulpitis with symptomatic apical periodontitis  Applied topical benzocaine, administered 1 carp 2% lido 1:100k epi via infiltration and 2 carps 4% articaine 1:100k epi via infiltration  Caries removed and pulp chamber accessed with round carbide  Early coronal flare with endo access bur  Working length NOT determined, hand filed to Hernadez Sivan with copious peridex irrigation  Dried canal with paper points  Placed CaOH2, cotton pellet, and restored with IRM  2 canals located (B/L), estimated 18mm WL based on PA  Discussed pt can either save tooth with RCT therapy or EXT  Informed pt if he elected RCT therapy, a crown is needed afterwards  Pt will decide RCT or EXT based on insurance coverage or SFS       NV: RCT #13 if pt interested or EXT

## 2022-05-06 ENCOUNTER — OFFICE VISIT (OUTPATIENT)
Dept: DENTISTRY | Facility: CLINIC | Age: 52
End: 2022-05-06

## 2022-05-06 VITALS — SYSTOLIC BLOOD PRESSURE: 134 MMHG | HEART RATE: 83 BPM | TEMPERATURE: 98 F | DIASTOLIC BLOOD PRESSURE: 81 MMHG

## 2022-05-06 DIAGNOSIS — K02.9 CHRONIC DENTAL CARIES EXTENDING TO PULP: Primary | ICD-10-CM

## 2022-05-06 PROCEDURE — WIS3000 RC ACCESS: Performed by: DENTIST

## 2022-05-06 NOTE — PROGRESS NOTES
RCT - Stage [facility] presents for stage I endo #13  PMH reviewed, no changes  Tooth was accessed by Dr Alton Sewell on 4/29/22  1 buccal and 1 lingual canal was found  Applied topical benzocaine, administered 1 carp 2% lido 1:100k epi via buccal/local infiltration   Clamp and rubber dam placed  Caries removed and pulp chamber accessed with round carbide  Access opening refined with flame shaped binh bur to achieve straight line access  Tooth crumbled during refining of access opening and removal of remaining caries  Buccal and lingual cusp still present  However, mesial and distal walls removed to excavate decay and refine access opening  Hand filed to k file with NaOCl irrigation  Dried canal with paper points  Placed CaOH2, cotton pellet, and restored with [facility] GI  Canals are restricted  1 canal found  Pulpal chamber needed to be de-roofed  ?  A: Tooth is restorable, but has a guarded prognosis  Moderate radiographic bone loss present  Chronic generalized periodontosis  Caries extending to the pulp  P: Pt wants to try to hold on to tooth #13 for as long as possible  Pt was informed in laymen's terms that tooth has a guarded prognosis due to the moderate radiographic bone loss present and sub-g caries  He understands that in order to hold on to tooth #13 for as long as possible he will need to return for comprehensive exam and peridontal evaluation to determine periodontal health in addition to completion of RCT  Pt informed that RCT, post and core, and crown will be needed to restore tooth #13      DONE TODAY  Refine access opening tooth #13    NEXT VISIT  Instrument and obturate

## 2022-05-18 ENCOUNTER — OFFICE VISIT (OUTPATIENT)
Dept: PAIN MEDICINE | Facility: MEDICAL CENTER | Age: 52
End: 2022-05-18
Payer: MEDICARE

## 2022-05-18 VITALS
HEART RATE: 66 BPM | WEIGHT: 202 LBS | OXYGEN SATURATION: 96 % | HEIGHT: 69 IN | DIASTOLIC BLOOD PRESSURE: 60 MMHG | SYSTOLIC BLOOD PRESSURE: 116 MMHG | TEMPERATURE: 98.3 F | BODY MASS INDEX: 29.92 KG/M2

## 2022-05-18 DIAGNOSIS — M54.50 CHRONIC BILATERAL LOW BACK PAIN WITHOUT SCIATICA: Primary | ICD-10-CM

## 2022-05-18 DIAGNOSIS — M47.816 LUMBAR SPONDYLOSIS: ICD-10-CM

## 2022-05-18 DIAGNOSIS — G89.29 CHRONIC BILATERAL LOW BACK PAIN WITHOUT SCIATICA: Primary | ICD-10-CM

## 2022-05-18 PROCEDURE — 99214 OFFICE O/P EST MOD 30 MIN: CPT | Performed by: NURSE PRACTITIONER

## 2022-05-18 RX ORDER — PREGABALIN 75 MG/1
75 CAPSULE ORAL 3 TIMES DAILY
Qty: 90 CAPSULE | Refills: 0 | Status: SHIPPED | OUTPATIENT
Start: 2022-05-18 | End: 2022-06-08

## 2022-05-18 NOTE — PROGRESS NOTES
Assessment  1  Chronic bilateral low back pain without sciatica    2  Lumbar spondylosis        Plan  We will schedule the patient for a bilateral L3-5 facet medial branch blocks with intention of moving forward towards radiofrequency ablation if there is an appropriate diagnostic response  The initial blocks will be performed with 2% lidocaine and if an appropriate response is obtained upon review of the patient's pain diary, a confirmatory block will be scheduled with 0 5% bupivacaine  Patient does attempt to complete home exercises however they do cause him a lot of pain  Initiate Lyrica 75 mg 1 tablet 3 times daily  Patient and wife were made aware of most common side effects which are drowsiness, dizziness, blurry vision swelling of the hands and feet  Patient to return in 1 month to review new medication  Complete risks and benefits including bleeding, infection, tissue reaction, nerve injury and allergic reaction were discussed  The approach was demonstrated using models and literature was provided  Verbal and written consent was obtained  My impressions and treatment recommendations were discussed in detail with the patient who verbalized understanding and had no further questions  Discharge instructions were provided  I personally saw and examined the patient and I agree with the above discussed plan of care  No orders of the defined types were placed in this encounter  New Medications Ordered This Visit   Medications    pregabalin (LYRICA) 75 mg capsule     Sig: Take 1 capsule (75 mg total) by mouth in the morning and 1 capsule (75 mg total) in the evening and 1 capsule (75 mg total) before bedtime  Dispense:  90 capsule     Refill:  0       History of Present Illness    Art Garvin is a 45 y o  male who presents with his wife for follow-up related to his chronic axial low back pain symptoms  Today he reports he is doing worsened rates his pain 9/10    He has constant pain throughout the entirety of the day described as pressure-like, and numbness  Patient did trial duloxetine with no relief  He has also tried gabapentin in the past with no relief  Patient has attempted to do home exercises however it causes him so much pain to the point that he cries his wife said  He is on able to complete a course of formal physical therapy due to such severe pain symptoms  I have personally reviewed and/or updated the patient's past medical history, past surgical history, family history, social history, current medications, allergies, and vital signs today  Review of Systems   Respiratory: Negative for shortness of breath  Cardiovascular: Negative for chest pain  Gastrointestinal: Negative for constipation, diarrhea, nausea and vomiting  Musculoskeletal: Positive for back pain, myalgias, neck pain and neck stiffness  Negative for arthralgias, gait problem and joint swelling  Skin: Negative for rash  Neurological: Positive for weakness  Negative for dizziness and seizures  All other systems reviewed and are negative        Patient Active Problem List   Diagnosis    [facility]    Impingement syndrome of left shoulder    Colonoscopy refused    Plantar fasciitis of right foot    Chronic bilateral low back pain without sciatica    Annual physical exam    Lumbar spondylosis       Past Medical History:   Diagnosis Date    Anxiety     Chronic back pain     Depression     Diabetes mellitus ([address] )        Past Surgical History:   Procedure Laterality Date    CHOLECYSTECTOMY  2007    HERNIA REPAIR  2006       Family History   Problem Relation Age of Onset    Diabetes Mother         Mellitus    Osteoarthritis Maternal Grandmother     Hypertension Maternal Grandfather     Nephrolithiasis Family     Cancer Maternal Uncle         Unknown    No Known Problems Father        Social History     Occupational History    Not on file   Tobacco Use    Smoking status: Never Smoker    Smokeless tobacco: Never Used   Vaping Use    Vaping Use: Never used   Substance and Sexual Activity    Alcohol use: Not Currently    Drug use: Never    Sexual activity: Yes       Current Outpatient Medications on File Prior to Visit   Medication Sig    fluticasone (FLONASE) 50 mcg/act nasal spray 1 spray into each nostril daily    Jardiance 10 MG TABS TAKE 1 TABLET BY MOUTH DAILY    Alcohol Swabs 70 % PADS As directed (Patient not taking: Reported on 11/22/2021)    Blood Glucose Monitoring Suppl (FREESTYLE LITE) FRANCIS by Other route daily (Patient not taking: Reported on 11/22/2021 )    docusate sodium (COLACE) 100 mg capsule Take 1 capsule (100 mg total) by mouth 2 (two) times a day for 15 days    glucose blood (FREESTYLE LITE) test strip TEST TWICE DAILY (Patient not taking: Reported on 11/22/2021 )    Lancets (FREESTYLE) lancets by Other route daily Use as instructed (Patient not taking: Reported on 11/22/2021 )    [DISCONTINUED] DULoxetine (CYMBALTA) 30 mg delayed release capsule Take 1 capsule (30 mg total) by mouth daily     No current facility-administered medications on file prior to visit  Allergies   Allergen Reactions    Morphine GI Intolerance     Morphine patch only, per pt       Physical Exam    /60   Pulse 66   Temp 98 3 °F (36 8 °C)   Ht 5' 9" (1 753 m)   Wt 91 6 kg (202 lb)   SpO2 96%   BMI 29 83 kg/m²     Constitutional: normal, well developed, well nourished, alert, in no distress and non-toxic and no overt pain behavior    Eyes: anicteric  HEENT: grossly intact  Neck: supple, symmetric, trachea midline and no masses   Pulmonary:even and unlabored  Cardiovascular:No edema or pitting edema present  Skin:Normal without rashes or lesions and well hydrated  Psychiatric:Mood and affect appropriate  Neurologic:Cranial Nerves II-XII grossly intact  Musculoskeletal:normal   Lumbar Spine Exam    Appearance:  Normal lordosis  Palpation/Tenderness:  left lumbar paraspinal tenderness  right lumbar paraspinal tenderness    Special Tests:  Facet loading maneuvers reproduce patient's pain complaints bilaterally      Imaging

## 2022-05-31 ENCOUNTER — OFFICE VISIT (OUTPATIENT)
Dept: DENTISTRY | Facility: CLINIC | Age: 52
End: 2022-05-31

## 2022-05-31 VITALS — SYSTOLIC BLOOD PRESSURE: 115 MMHG | DIASTOLIC BLOOD PRESSURE: 74 MMHG | TEMPERATURE: 98.6 F | HEART RATE: 85 BPM

## 2022-05-31 DIAGNOSIS — Z01.20 ENCOUNTER FOR DENTAL EXAMINATION: Primary | ICD-10-CM

## 2022-05-31 PROCEDURE — D0210 INTRAORAL - COMPLETE SERIES OF RADIOGRAPHIC IMAGES: HCPCS | Performed by: DENTIST

## 2022-05-31 RX ORDER — AMOXICILLIN 500 MG/1
500 CAPSULE ORAL EVERY 8 HOURS SCHEDULED
Qty: 21 CAPSULE | Refills: 0 | Status: SHIPPED | OUTPATIENT
Start: 2022-05-31 | End: 2022-06-08

## 2022-06-01 NOTE — PROGRESS NOTES
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

## 2022-06-08 ENCOUNTER — TELEMEDICINE (OUTPATIENT)
Dept: FAMILY MEDICINE CLINIC | Facility: CLINIC | Age: 52
End: 2022-06-08

## 2022-06-08 DIAGNOSIS — M47.816 LUMBAR SPONDYLOSIS: ICD-10-CM

## 2022-06-08 DIAGNOSIS — G89.29 CHRONIC BILATERAL LOW BACK PAIN WITHOUT SCIATICA: ICD-10-CM

## 2022-06-08 DIAGNOSIS — E11.9 DIABETES MELLITUS WITHOUT COMPLICATION (HCC): Primary | ICD-10-CM

## 2022-06-08 DIAGNOSIS — M54.50 CHRONIC BILATERAL LOW BACK PAIN WITHOUT SCIATICA: ICD-10-CM

## 2022-06-08 DIAGNOSIS — Z13.220 SCREENING FOR CHOLESTEROL LEVEL: ICD-10-CM

## 2022-06-08 DIAGNOSIS — E11.9 TYPE 2 DIABETES MELLITUS WITHOUT COMPLICATION, WITHOUT LONG-TERM CURRENT USE OF INSULIN (HCC): ICD-10-CM

## 2022-06-08 PROBLEM — M75.42 IMPINGEMENT SYNDROME OF LEFT SHOULDER: Status: RESOLVED | Noted: 2019-06-10 | Resolved: 2022-06-08

## 2022-06-08 PROBLEM — Z00.00 ANNUAL PHYSICAL EXAM: Status: RESOLVED | Noted: 2021-09-15 | Resolved: 2022-06-08

## 2022-06-08 PROBLEM — M72.2 PLANTAR FASCIITIS OF RIGHT FOOT: Status: RESOLVED | Noted: 2020-04-30 | Resolved: 2022-06-08

## 2022-06-08 PROCEDURE — 99214 OFFICE O/P EST MOD 30 MIN: CPT | Performed by: FAMILY MEDICINE

## 2022-06-08 RX ORDER — EMPAGLIFLOZIN 10 MG/1
10 TABLET, FILM COATED ORAL DAILY
Qty: 90 TABLET | Refills: 2 | Status: SHIPPED | OUTPATIENT
Start: 2022-06-08

## 2022-06-08 RX ORDER — TRAMADOL HYDROCHLORIDE 100 MG/1
100 TABLET, COATED ORAL DAILY PRN
Qty: 60 TABLET | Refills: 0 | Status: SHIPPED | OUTPATIENT
Start: 2022-06-08 | End: 2022-07-08

## 2022-06-08 NOTE — ASSESSMENT & PLAN NOTE
Lab Results   Component Value Date    HGBA1C 7 5 (A) 09/09/2021     - Repeat HBA1C   - Continue Jardiance 10mg QD  - Advised patient to stop taking prednisone as this can worsen his diabetes

## 2022-06-08 NOTE — ASSESSMENT & PLAN NOTE
- Scheduled for procedure with pain management in July  - Patient continues to have pain and NSAIDs and Tylenol do not relieve his pain  Therefore, patient has been using prednisone he is getting from a friend  Advised to discontinue the use of prednisone due to hyperglycemia and other side effects from prolonged use  - Will prescribe a one time order of Tramadol 100mg QD PRN for severe pain to use until patient has procedure done     - Stop lyrica as patient is not taking it due to dizziness

## 2022-06-08 NOTE — PROGRESS NOTES
Virtual Brief Visit    Patient is located in the following state in which I hold an active license [place]      Assessment/Plan:    Problem List Items Addressed This Visit        Endocrine    Diabetes mellitus without complication ([facility] ) - Primary       Lab Results   Component Value Date    HGBA1C 7 5 (A) 09/09/2021     - Repeat HBA1C   - Continue Jardiance 10mg QD  - Advised patient to stop taking prednisone as this can worsen his diabetes  Relevant Medications    Jardiance 10 MG TABS    Other Relevant Orders    HEMOGLOBIN A1C W/ EAG ESTIMATION       Musculoskeletal and Integument    Lumbar spondylosis     - Plan as below              Other    Screening for cholesterol level    Relevant Orders    Lipid panel    Chronic bilateral low back pain without sciatica     - Scheduled for procedure with pain management in July  - Patient continues to have pain and NSAIDs and Tylenol do not relieve his pain  Therefore, patient has been using prednisone he is getting from a friend  Advised to discontinue the use of prednisone due to hyperglycemia and other side effects from prolonged use  - Will prescribe a one time order of Tramadol 100mg QD PRN for severe pain to use until patient has procedure done  - Stop Debbie Para as patient is not taking it due to dizziness            Relevant Medications    traMADol 100 MG TABS      Other Visit Diagnoses     Type 2 diabetes mellitus without complication, without long-term current use of insulin (HCC)        Relevant Medications    Jardiance 10 MG TABS          Recent Visits  No visits were found meeting these conditions  Showing recent visits within past 7 days and meeting all other requirements  Today's Visits  Date Type Provider Dept   06/08/22 Telemedicine Annie Cruz MD [facility]   Showing today's visits and meeting all other requirements  Future Appointments  No visits were found meeting these conditions    Showing future appointments within next 150 days and meeting all other requirements         I spent 15 minutes directly with the patient during this visit

## 2022-06-13 ENCOUNTER — TELEPHONE (OUTPATIENT)
Dept: FAMILY MEDICINE CLINIC | Facility: CLINIC | Age: 52
End: 2022-06-13

## 2022-07-05 ENCOUNTER — HOSPITAL ENCOUNTER (OUTPATIENT)
Dept: RADIOLOGY | Facility: MEDICAL CENTER | Age: 52
Discharge: HOME | End: 2022-07-05
Admitting: PHYSICAL MEDICINE & REHABILITATION
Payer: MEDICARE

## 2022-07-05 VITALS
HEART RATE: 64 BPM | SYSTOLIC BLOOD PRESSURE: 113 MMHG | DIASTOLIC BLOOD PRESSURE: 73 MMHG | OXYGEN SATURATION: 98 % | TEMPERATURE: 97.4 F | RESPIRATION RATE: 18 BRPM

## 2022-07-05 DIAGNOSIS — M54.50 CHRONIC BILATERAL LOW BACK PAIN WITHOUT SCIATICA: ICD-10-CM

## 2022-07-05 DIAGNOSIS — G89.29 CHRONIC BILATERAL LOW BACK PAIN WITHOUT SCIATICA: ICD-10-CM

## 2022-07-05 DIAGNOSIS — M47.816 LUMBAR SPONDYLOSIS: ICD-10-CM

## 2022-07-05 PROCEDURE — 64494 INJ PARAVERT F JNT L/S 2 LEV: CPT | Performed by: PHYSICAL MEDICINE & REHABILITATION

## 2022-07-05 PROCEDURE — 64493 INJ PARAVERT F JNT L/S 1 LEV: CPT | Performed by: PHYSICAL MEDICINE & REHABILITATION

## 2022-07-05 RX ADMIN — Medication 3 ML: at 14:19

## 2022-07-05 NOTE — H&P
History of Present Illness:  The patient is a 47 y o  male who presents with complaints of bilateral lower back pain    Patient Active Problem List   Diagnosis    Diabetes mellitus without complication ([facility] )    Colonoscopy refused    Screening for cholesterol level    Chronic bilateral low back pain without sciatica    Lumbar spondylosis       Past Medical History:   Diagnosis Date    Anxiety     Chronic back pain     Depression     [facility] )        Past Surgical History:   Procedure Laterality Date    CHOLECYSTECTOMY  2007    HERNIA REPAIR  2006         Current Outpatient Medications:     Alcohol Swabs 70 % PADS, As directed, Disp: , Rfl:     Blood Glucose Monitoring Suppl (FREESTYLE LITE) FRANCIS, by Other route daily, Disp: 1 each, Rfl: 0    fluticasone (FLONASE) 50 mcg/act nasal spray, 1 spray into each nostril daily, Disp: 1 Bottle, Rfl: 1    glucose blood (FREESTYLE LITE) test strip, TEST TWICE DAILY, Disp: 100 each, Rfl: 5    Jardiance 10 MG TABS, Take 1 tablet (10 mg total) by mouth daily, Disp: 90 tablet, Rfl: 2    Lancets (FREESTYLE) lancets, by Other route daily Use as instructed, Disp: 100 each, Rfl: 5    traMADol 100 MG TABS, Take 100 mg by mouth daily as needed for moderate pain or severe pain, Disp: 60 tablet, Rfl: 0    Current Facility-Administered Medications:     lidocaine (PF) (XYLOCAINE-MPF) 2 % injection 3 mL, 3 mL, Perineural, Once, Miquel Gens, DO    Allergies   Allergen Reactions    Morphine GI Intolerance     Morphine patch only, per pt       Physical Exam:   Vitals:    07/05/22 1407   BP: 128/83   Pulse: 64   Resp: 18   Temp: (!) 97 4 °F (36 3 °C)   SpO2: 97%     General: Awake, Alert, Oriented x 3, Mood and affect appropriate  Respiratory: Respirations even and unlabored  Cardiovascular: Peripheral pulses intact; no edema  Musculoskeletal Exam: bilateral lower back pain    ASA Score: 2    Patient/Chart Verification  Patient ID Verified: Verbal  ID Band Applied: No  Consents Confirmed: Procedural, To be obtained in the Pre-Procedure area  H&P( within 30 days) Verified: To be obtained in the Pre-Procedure area  Interval H&P(within 24 hr) Complete (required for Outpatients and Surgery Admit only): To be obtained in the Pre-Procedure area  Allergies Reviewed: Yes  Anticoag/NSAID held?: NA  Currently on antibiotics?: No    Assessment:   1  Chronic bilateral low back pain without sciatica    2   Lumbar spondylosis        Plan: (B) L3-5 MBB#1

## 2022-07-05 NOTE — DISCHARGE INSTRUCTIONS

## 2022-07-06 ENCOUNTER — TELEPHONE (OUTPATIENT)
Dept: RADIOLOGY | Facility: MEDICAL CENTER | Age: 52
End: 2022-07-06

## 2022-07-06 NOTE — TELEPHONE ENCOUNTER
Pain diary reviewed by Dr Raffi Contreras; proceed with MBB and f/u; I will call and coordinate       Bilateral L3-5

## 2022-07-12 NOTE — TELEPHONE ENCOUNTER
Using [employer] [id number], left message for patient to call me back DIRECTLY to schedule  Left my DIRECT phone # [phone] on message

## 2022-07-19 ENCOUNTER — OFFICE VISIT (OUTPATIENT)
Dept: DENTISTRY | Facility: CLINIC | Age: 52
End: 2022-07-19

## 2022-07-19 VITALS — SYSTOLIC BLOOD PRESSURE: 142 MMHG | TEMPERATURE: 99.3 F | DIASTOLIC BLOOD PRESSURE: 88 MMHG | HEART RATE: 65 BPM

## 2022-07-19 DIAGNOSIS — K04.1 PULPAL NECROSIS: Primary | ICD-10-CM

## 2022-07-19 PROCEDURE — D7140 EXTRACTION, ERUPTED TOOTH OR EXPOSED ROOT (ELEVATION AND/OR FORCEPS REMOVAL): HCPCS

## 2022-07-19 NOTE — PROGRESS NOTES
Oral Surgery    Art Sanders Ohm presents for Ext #13, ASA III, No pain    RPMH, patient denies any changes  Obtained a direct and personal consent  Risks and complications were explained  Pt agreed and consented  Consent scanned in [facility] center  Pre-Op BP WNL  Administered 2 carpules of 2 % Lidocaine w/ 1:100,000 epi via infiltration  ? Adequate anesthesia obtained, elevated, and extracted #13   Socket irrigated, and 3 0 chromic gut sutures placed along with colaplug       Upon dismissal, patient received POI, gauze    NV: complete exam

## 2022-08-01 ENCOUNTER — TELEPHONE (OUTPATIENT)
Dept: PAIN MEDICINE | Facility: MEDICAL CENTER | Age: 52
End: 2022-08-01

## 2022-09-21 ENCOUNTER — OFFICE VISIT (OUTPATIENT)
Dept: FAMILY MEDICINE CLINIC | Facility: CLINIC | Age: 52
End: 2022-09-21

## 2022-09-21 VITALS
OXYGEN SATURATION: 97 % | HEART RATE: 80 BPM | TEMPERATURE: 97.7 F | BODY MASS INDEX: 30.56 KG/M2 | SYSTOLIC BLOOD PRESSURE: 126 MMHG | HEIGHT: 69 IN | WEIGHT: 206.3 LBS | DIASTOLIC BLOOD PRESSURE: 82 MMHG | RESPIRATION RATE: 18 BRPM

## 2022-09-21 DIAGNOSIS — M79.662 PAIN IN LEFT LOWER LEG: ICD-10-CM

## 2022-09-21 DIAGNOSIS — M54.50 CHRONIC BILATERAL LOW BACK PAIN WITHOUT SCIATICA: ICD-10-CM

## 2022-09-21 DIAGNOSIS — Z11.59 NEED FOR HEPATITIS C SCREENING TEST: ICD-10-CM

## 2022-09-21 DIAGNOSIS — G89.29 CHRONIC BILATERAL LOW BACK PAIN WITHOUT SCIATICA: ICD-10-CM

## 2022-09-21 DIAGNOSIS — E11.9 DIABETES MELLITUS WITHOUT COMPLICATION (HCC): Primary | ICD-10-CM

## 2022-09-21 DIAGNOSIS — Z11.4 SCREENING FOR HIV (HUMAN IMMUNODEFICIENCY VIRUS): ICD-10-CM

## 2022-09-21 DIAGNOSIS — M77.01 EPICONDYLITIS ELBOW, MEDIAL, RIGHT: ICD-10-CM

## 2022-09-21 PROCEDURE — 99214 OFFICE O/P EST MOD 30 MIN: CPT | Performed by: FAMILY MEDICINE

## 2022-09-21 RX ORDER — HYDROXYZINE PAMOATE 50 MG/1
CAPSULE ORAL
COMMUNITY
Start: 2022-09-10

## 2022-09-21 NOTE — ASSESSMENT & PLAN NOTE
Lab Results   Component Value Date    HGBA1C 7 5 (A) 09/09/2021     - Repeat HBA1C   - Continue Jardiance 10mg QD

## 2022-09-21 NOTE — ASSESSMENT & PLAN NOTE
-  Start diclofenac gel twice daily to affected area  -  Will order tennis elbow strap patient to buy at the pharmacy

## 2022-09-21 NOTE — PROGRESS NOTES
Name: Tierney Boyer      : 1970      MRN: [id number]  Encounter Provider: Usha Buck MD  Encounter Date: 2022   Encounter department: [address] [place], [place] [address]     1  Diabetes mellitus without complication [facility])  Assessment & Plan:    Lab Results   Component Value Date    HGBA1C 7 5 (A) 2021     - Repeat HBA1C   - Continue Jardiance 10mg QD      Orders:  -     Microalbumin / creatinine urine ratio    2  Need for hepatitis C screening test  -     Hepatitis C Antibody ([facility]); Future    3  Screening for HIV (human immunodeficiency virus)  -     HIV 1/2 Antigen/Antibody (4th Generation) w Reflex [facility]; Future    4  Chronic bilateral low back pain without sciatica  Assessment & Plan:  - Is following with pain management      5  Epicondylitis elbow, medial, right  Assessment & Plan:  -  Start diclofenac gel twice daily to affected area  -  Will order tennis elbow strap patient to buy at the pharmacy  Orders:  -     Tennis elbow strap  -     Diclofenac Sodium (VOLTAREN) 1 %; Apply 2 g topically 4 (four) times a day    6  Pain in left lower leg  Assessment & Plan:  -   Advised to apply diclofenac twice daily to affected area  Orders:  -     Diclofenac Sodium (VOLTAREN) 1 %; Apply 2 g topically 4 (four) times a day    BMI Counseling: Body mass index is 30 47 kg/m²  The BMI is above normal  Nutrition recommendations include encouraging healthy choices of fruits and vegetables and decreasing fast food intake  Exercise recommendations include exercising 3-5 times per week  Patient referred to PCP  Rationale for BMI follow-up plan is due to patient being overweight or obese  Depression Screening and Follow-up Plan: Patient was screened for depression during today's encounter  They screened negative with a PHQ-2 score of 0          Subjective        Is a pleasant 67-year-old male with past medical history of type 2 diabetes who presents to the office for follow-up of his chronic conditions and L elbow pain and R leg pain  Patient reports that sugars have been controlled at home and he is compliant with his medication  Patient does report that for the last few weeks he has had pain in his left lower arm on the outside of his elbow  He reports that this pain does not let him sleep well but he can still perform his ADLs  He also reports right lower leg pain of that also started several weeks ago that does not affect his gait but causes him significant pain where he has to rest after standing for a while  No other complaints or other associated symptoms  Review of Systems   Constitutional: Negative for fatigue and fever  HENT: Negative for congestion and sore throat  Eyes: Negative for visual disturbance  Respiratory: Negative for cough, shortness of breath and wheezing  Cardiovascular: Negative for chest pain, palpitations and leg swelling  Gastrointestinal: Negative for abdominal pain, anal bleeding, constipation, diarrhea, nausea and vomiting  Endocrine: Negative for cold intolerance and heat intolerance  Musculoskeletal: Negative for arthralgias and joint swelling  Skin: Negative for color change  Neurological: Negative for dizziness, seizures, syncope, weakness and light-headedness  Psychiatric/Behavioral: Negative for agitation, confusion, sleep disturbance and suicidal ideas         Current Outpatient Medications on File Prior to Visit   Medication Sig    Alcohol Swabs 70 % PADS As directed    Blood Glucose Monitoring Suppl (FREESTYLE LITE) FRANCIS by Other route daily    fluticasone (FLONASE) 50 mcg/act nasal spray 1 spray into each nostril daily    glucose blood (FREESTYLE LITE) test strip TEST TWICE DAILY    hydrOXYzine pamoate (VISTARIL) 50 mg capsule     Jardiance 10 MG TABS Take 1 tablet (10 mg total) by mouth daily    Lancets (FREESTYLE) lancets by Other route daily Use as instructed Objective     /82 (BP Location: Left arm, Patient Position: Sitting, Cuff Size: Standard)   Pulse 80   Temp 97 7 °F (36 5 °C) (Temporal)   Resp 18   Ht 5' 9" (1 753 m)   Wt 93 6 kg (206 lb 4 8 oz)   SpO2 97%   BMI 30 47 kg/m²     Physical Exam  Constitutional:       Appearance: He is well-developed  HENT:      Head: Normocephalic and atraumatic  Eyes:      Conjunctiva/sclera: Conjunctivae normal       Pupils: Pupils are equal, round, and reactive to light  Neck:      Vascular: No JVD  Cardiovascular:      Rate and Rhythm: Normal rate and regular rhythm  Heart sounds: Normal heart sounds  No murmur heard  No friction rub  No gallop  Pulmonary:      Effort: Pulmonary effort is normal  No respiratory distress  Breath sounds: Normal breath sounds  No wheezing  Abdominal:      General: Bowel sounds are normal  There is no distension  Palpations: Abdomen is soft  Tenderness: There is no abdominal tenderness  Musculoskeletal:         General: Normal range of motion  Cervical back: Normal range of motion and neck supple  Skin:     General: Skin is warm and dry  Neurological:      Mental Status: He is alert and oriented to person, place, and time  Deep Tendon Reflexes: Reflexes are normal and symmetric  Psychiatric:         Behavior: Behavior normal          Thought Content:  Thought content normal          Judgment: Judgment normal        Billy Burgess MD

## 2023-01-19 ENCOUNTER — OFFICE VISIT (OUTPATIENT)
Dept: FAMILY MEDICINE CLINIC | Facility: CLINIC | Age: 53
End: 2023-01-19

## 2023-01-19 ENCOUNTER — TELEPHONE (OUTPATIENT)
Dept: FAMILY MEDICINE CLINIC | Facility: CLINIC | Age: 53
End: 2023-01-19

## 2023-01-19 VITALS
WEIGHT: 202.5 LBS | OXYGEN SATURATION: 97 % | RESPIRATION RATE: 18 BRPM | TEMPERATURE: 98.4 F | DIASTOLIC BLOOD PRESSURE: 70 MMHG | SYSTOLIC BLOOD PRESSURE: 118 MMHG | HEIGHT: 69 IN | BODY MASS INDEX: 29.99 KG/M2 | HEART RATE: 79 BPM

## 2023-01-19 DIAGNOSIS — E11.9 TYPE 2 DIABETES MELLITUS WITHOUT COMPLICATION, WITHOUT LONG-TERM CURRENT USE OF INSULIN (HCC): ICD-10-CM

## 2023-01-19 DIAGNOSIS — E11.9 DIABETES MELLITUS WITHOUT COMPLICATION (HCC): Primary | ICD-10-CM

## 2023-01-19 DIAGNOSIS — B35.3 TINEA PEDIS OF BOTH FEET: ICD-10-CM

## 2023-01-19 DIAGNOSIS — Z12.11 SCREENING FOR COLON CANCER: ICD-10-CM

## 2023-01-19 PROBLEM — Z53.20 COLONOSCOPY REFUSED: Status: RESOLVED | Noted: 2019-06-20 | Resolved: 2023-01-19

## 2023-01-19 PROBLEM — M77.01 EPICONDYLITIS ELBOW, MEDIAL, RIGHT: Status: RESOLVED | Noted: 2022-09-21 | Resolved: 2023-01-19

## 2023-01-19 PROBLEM — Z13.220 SCREENING FOR CHOLESTEROL LEVEL: Status: RESOLVED | Noted: 2020-06-25 | Resolved: 2023-01-19

## 2023-01-19 PROBLEM — M79.662 PAIN IN LEFT LOWER LEG: Status: RESOLVED | Noted: 2022-09-21 | Resolved: 2023-01-19

## 2023-01-19 RX ORDER — EMPAGLIFLOZIN 10 MG/1
10 TABLET, FILM COATED ORAL DAILY
Qty: 90 TABLET | Refills: 2 | Status: SHIPPED | OUTPATIENT
Start: 2023-01-19

## 2023-01-19 RX ORDER — CLOTRIMAZOLE 1 %
CREAM (GRAM) TOPICAL 2 TIMES DAILY
Qty: 60 G | Refills: 0 | Status: SHIPPED | OUTPATIENT
Start: 2023-01-19

## 2023-01-19 NOTE — PROGRESS NOTES
Name: Lindsey Carroll      : 1970      MRN: [id number]  Encounter Provider: Efra Person MD  Encounter Date: 2023   Encounter department: [facility]     1  Diabetes mellitus without complication [facility])  Assessment & Plan:    Lab Results   Component Value Date    HGBA1C 7 5 (A) 2021     - Repeat HBA1C   - Continue Jardiance 10mg QD      Orders:  -     Basic metabolic panel; Future  -     IRIS Diabetic eye exam    2  Type 2 diabetes mellitus without complication, without long-term current use of insulin (HCC)  -     Jardiance 10 MG TABS tablet; Take 1 tablet (10 mg total) by mouth daily    3  Tinea pedis of both feet  Assessment & Plan:  - Start clotrimazole 1% BID x 7 days    Orders:  -     clotrimazole (LOTRIMIN) 1 % cream; Apply topically 2 (two) times a day    4  Screening for colon cancer  -     Cologuard         Subjective      Pleasant patient 23-year-old male with past medical his type 2 diabetes mellitus very well controlled presents the office today for follow-up visit of his chronic conditions  Reports complaince with medications  No other complaints  Review of Systems   HENT: Negative for congestion and sore throat  Eyes: Negative for visual disturbance  Respiratory: Negative for cough and wheezing  Cardiovascular: Negative for palpitations and leg swelling  Gastrointestinal: Negative for anal bleeding, constipation, diarrhea, nausea and vomiting  Endocrine: Negative for cold intolerance and heat intolerance  Musculoskeletal: Negative for arthralgias and joint swelling  Skin: Negative for color change  Neurological: Negative for syncope and light-headedness  Psychiatric/Behavioral: Negative for agitation, sleep disturbance and suicidal ideas         Current Outpatient Medications on File Prior to Visit   Medication Sig   • Alcohol Swabs 70 % PADS As directed   • Blood Glucose Monitoring Suppl (FREESTYLE LITE) FRANCIS by Other route daily   • Diclofenac Sodium (VOLTAREN) 1 % Apply 2 g topically 4 (four) times a day   • fluticasone (FLONASE) 50 mcg/act nasal spray 1 spray into each nostril daily   • glucose blood (FREESTYLE LITE) test strip TEST TWICE DAILY   • hydrOXYzine pamoate (VISTARIL) 50 mg capsule    • Lancets (FREESTYLE) lancets by Other route daily Use as instructed   • [DISCONTINUED] Jardiance 10 MG TABS Take 1 tablet (10 mg total) by mouth daily       Objective     /70 (BP Location: Left arm, Patient Position: Sitting, Cuff Size: Standard)   Pulse 79   Temp 98 4 °F (36 9 °C) (Temporal)   Resp 18   Ht 5' 9" (1 753 m)   Wt 91 9 kg (202 lb 8 oz)   SpO2 97%   BMI 29 90 kg/m²     Physical Exam  Constitutional:       Appearance: He is well-developed  HENT:      Head: Normocephalic and atraumatic  Eyes:      Conjunctiva/sclera: Conjunctivae normal       Pupils: Pupils are equal, round, and reactive to light  Neck:      Vascular: No JVD  Cardiovascular:      Rate and Rhythm: Normal rate and regular rhythm  Pulses: no weak pulses          Dorsalis pedis pulses are 2+ on the right side and 2+ on the left side  Posterior tibial pulses are 2+ on the right side and 2+ on the left side  Heart sounds: Normal heart sounds  No murmur heard  No friction rub  No gallop  Pulmonary:      Effort: Pulmonary effort is normal  No respiratory distress  Breath sounds: Normal breath sounds  No wheezing  Abdominal:      General: Bowel sounds are normal  There is no distension  Palpations: Abdomen is soft  Tenderness: There is no abdominal tenderness  Musculoskeletal:         General: Normal range of motion  Cervical back: Normal range of motion and neck supple  Feet:      Right foot:      Skin integrity: Dry skin present  No ulcer, erythema, warmth or callus  Left foot:      Skin integrity: Skin breakdown and dry skin present   No ulcer, erythema, warmth or callus  Skin:     General: Skin is warm and dry  Neurological:      Mental Status: He is alert and oriented to person, place, and time  Deep Tendon Reflexes: Reflexes are normal and symmetric  Psychiatric:         Behavior: Behavior normal          Thought Content: Thought content normal          Judgment: Judgment normal      Patient's shoes and socks removed  Right Foot/Ankle   Right Foot Inspection  Skin Exam: skin normal, skin intact and dry skin  No warmth, no callus, no erythema, no abnormal color, no pre-ulcer, no ulcer and no callus  Toe Exam: ROM and strength within normal limits  Sensory   Vibration: intact  Proprioception: intact  Monofilament testing: intact    Vascular  The right DP pulse is 2+  The right PT pulse is 2+  Left Foot/Ankle  Left Foot Inspection  Skin Exam: skin normal, skin intact, dry skin and maceration  No warmth, no erythema, normal color, no pre-ulcer, no ulcer and no callus  Toe Exam: ROM and strength within normal limits  Sensory   Vibration: intact  Proprioception: intact  Monofilament testing: intact    Vascular  The left DP pulse is 2+  The left PT pulse is 2+       Assign Risk Category  No deformity present  No loss of protective sensation  No weak pulses  Risk: 0      Nyasia Tracy MD

## 2023-01-24 ENCOUNTER — CLINICAL SUPPORT (OUTPATIENT)
Dept: FAMILY MEDICINE CLINIC | Facility: CLINIC | Age: 53
End: 2023-01-24

## 2023-01-24 DIAGNOSIS — E11.9 DIABETES MELLITUS WITHOUT COMPLICATION (HCC): ICD-10-CM

## 2023-01-24 LAB
LEFT EYE DIABETIC RETINOPATHY: NORMAL
LEFT EYE IMAGE QUALITY: NORMAL
LEFT EYE MACULAR EDEMA: NORMAL
LEFT EYE OTHER RETINOPATHY: NORMAL
RIGHT EYE DIABETIC RETINOPATHY: NORMAL
RIGHT EYE IMAGE QUALITY: NORMAL
RIGHT EYE MACULAR EDEMA: NORMAL
RIGHT EYE OTHER RETINOPATHY: NORMAL
SEVERITY (EYE EXAM): NORMAL

## 2023-02-02 ENCOUNTER — APPOINTMENT (EMERGENCY)
Dept: CT IMAGING | Facility: HOSPITAL | Age: 53
End: 2023-02-02

## 2023-02-02 ENCOUNTER — HOSPITAL ENCOUNTER (EMERGENCY)
Facility: HOSPITAL | Age: 53
Discharge: HOME | End: 2023-02-02
Attending: EMERGENCY MEDICINE | Admitting: EMERGENCY MEDICINE

## 2023-02-02 VITALS
SYSTOLIC BLOOD PRESSURE: 121 MMHG | WEIGHT: 205.47 LBS | HEART RATE: 65 BPM | DIASTOLIC BLOOD PRESSURE: 82 MMHG | BODY MASS INDEX: 30.34 KG/M2 | TEMPERATURE: 98.3 F | OXYGEN SATURATION: 98 % | RESPIRATION RATE: 20 BRPM

## 2023-02-02 DIAGNOSIS — R10.9 ABDOMINAL PAIN: Primary | ICD-10-CM

## 2023-02-02 DIAGNOSIS — R74.8 ELEVATED LIPASE: ICD-10-CM

## 2023-02-02 LAB
ALBUMIN SERPL BCP-MCNC: 4.7 G/DL (ref 3.5–5)
ALP SERPL-CCNC: 168 U/L (ref 43–122)
ALT SERPL W P-5'-P-CCNC: 27 U/L
ANION GAP SERPL CALCULATED.3IONS-SCNC: 10 MMOL/L (ref 5–14)
AST SERPL W P-5'-P-CCNC: 45 U/L (ref 17–59)
BACTERIA UR QL AUTO: ABNORMAL /HPF
BASOPHILS # BLD AUTO: 0.06 THOUSANDS/ÂΜL (ref 0–0.1)
BASOPHILS NFR BLD AUTO: 1 % (ref 0–1)
BILIRUB SERPL-MCNC: 0.61 MG/DL (ref 0.2–1)
BILIRUB UR QL STRIP: NEGATIVE
BUN SERPL-MCNC: 13 MG/DL (ref 5–25)
CALCIUM SERPL-MCNC: 9.7 MG/DL (ref 8.4–10.2)
CARDIAC TROPONIN I PNL SERPL HS: 2 NG/L
CHLORIDE SERPL-SCNC: 103 MMOL/L (ref 96–108)
CLARITY UR: CLEAR
CO2 SERPL-SCNC: 27 MMOL/L (ref 21–32)
COLOR UR: YELLOW
CREAT SERPL-MCNC: 0.79 MG/DL (ref 0.7–1.5)
EOSINOPHIL # BLD AUTO: 0.38 THOUSAND/ÂΜL (ref 0–0.61)
EOSINOPHIL NFR BLD AUTO: 4 % (ref 0–6)
ERYTHROCYTE [DISTWIDTH] IN BLOOD BY AUTOMATED COUNT: 13 % (ref 11.6–15.1)
GFR SERPL CREATININE-BSD FRML MDRD: 102 ML/MIN/1.73SQ M
GLUCOSE SERPL-MCNC: 124 MG/DL (ref 65–140)
GLUCOSE SERPL-MCNC: 154 MG/DL (ref 70–99)
GLUCOSE UR STRIP-MCNC: ABNORMAL MG/DL
HCT VFR BLD AUTO: 46.6 % (ref 36.5–49.3)
HGB BLD-MCNC: 15.4 G/DL (ref 12–17)
HGB UR QL STRIP.AUTO: NEGATIVE
IMM GRANULOCYTES # BLD AUTO: 0.04 THOUSAND/UL (ref 0–0.2)
IMM GRANULOCYTES NFR BLD AUTO: 0 % (ref 0–2)
KETONES UR STRIP-MCNC: NEGATIVE MG/DL
LEUKOCYTE ESTERASE UR QL STRIP: NEGATIVE
LIPASE SERPL-CCNC: 432 U/L (ref 23–300)
LYMPHOCYTES # BLD AUTO: 3.66 THOUSANDS/ÂΜL (ref 0.6–4.47)
LYMPHOCYTES NFR BLD AUTO: 34 % (ref 14–44)
MCH RBC QN AUTO: 30.1 PG (ref 26.8–34.3)
MCHC RBC AUTO-ENTMCNC: 33 G/DL (ref 31.4–37.4)
MCV RBC AUTO: 91 FL (ref 82–98)
MONOCYTES # BLD AUTO: 0.75 THOUSAND/ÂΜL (ref 0.17–1.22)
MONOCYTES NFR BLD AUTO: 7 % (ref 4–12)
MUCOUS THREADS UR QL AUTO: ABNORMAL
NEUTROPHILS # BLD AUTO: 5.77 THOUSANDS/ÂΜL (ref 1.85–7.62)
NEUTS SEG NFR BLD AUTO: 54 % (ref 43–75)
NITRITE UR QL STRIP: NEGATIVE
NON-SQ EPI CELLS URNS QL MICRO: ABNORMAL /HPF
NRBC BLD AUTO-RTO: 0 /100 WBCS
PH UR STRIP.AUTO: 7 [PH]
PLATELET # BLD AUTO: 360 THOUSANDS/UL (ref 149–390)
PMV BLD AUTO: 10.6 FL (ref 8.9–12.7)
POTASSIUM SERPL-SCNC: 4.5 MMOL/L (ref 3.5–5.3)
PROT SERPL-MCNC: 8.5 G/DL (ref 6.4–8.4)
PROT UR STRIP-MCNC: NEGATIVE MG/DL
RBC # BLD AUTO: 5.11 MILLION/UL (ref 3.88–5.62)
RBC #/AREA URNS AUTO: ABNORMAL /HPF
SODIUM SERPL-SCNC: 140 MMOL/L (ref 135–147)
SP GR UR STRIP.AUTO: 1.01 (ref 1–1.04)
UROBILINOGEN UA: NEGATIVE MG/DL
WBC # BLD AUTO: 10.66 THOUSAND/UL (ref 4.31–10.16)
WBC #/AREA URNS AUTO: ABNORMAL /HPF

## 2023-02-02 RX ORDER — FAMOTIDINE 10 MG/ML
20 INJECTION, SOLUTION INTRAVENOUS ONCE
Status: COMPLETED | OUTPATIENT
Start: 2023-02-02 | End: 2023-02-02

## 2023-02-02 RX ORDER — FAMOTIDINE 20 MG/1
20 TABLET, FILM COATED ORAL 2 TIMES DAILY
Qty: 30 TABLET | Refills: 0 | Status: SHIPPED | OUTPATIENT
Start: 2023-02-02

## 2023-02-02 RX ADMIN — IOHEXOL 100 ML: 350 INJECTION, SOLUTION INTRAVENOUS at 18:55

## 2023-02-02 RX ADMIN — FAMOTIDINE 20 MG: 10 INJECTION INTRAVENOUS at 18:16

## 2023-02-02 RX ADMIN — SODIUM CHLORIDE 1000 ML: 0.9 INJECTION, SOLUTION INTRAVENOUS at 18:15

## 2023-02-02 NOTE — Clinical Note
Venkata Wiley was seen and treated in our emergency department on 2/2/2023  Diagnosis:     Art    He may return on this date: 02/05/2023         If you have any questions or concerns, please don't hesitate to call        Tobi Weiner DO    ______________________________           _______________          _______________  Hospital Representative                              Date                                Time

## 2023-02-03 ENCOUNTER — APPOINTMENT (OUTPATIENT)
Dept: LAB | Facility: CLINIC | Age: 53
End: 2023-02-03

## 2023-02-03 DIAGNOSIS — Z11.4 SCREENING FOR HIV (HUMAN IMMUNODEFICIENCY VIRUS): ICD-10-CM

## 2023-02-03 DIAGNOSIS — Z13.220 SCREENING FOR CHOLESTEROL LEVEL: ICD-10-CM

## 2023-02-03 DIAGNOSIS — Z11.59 NEED FOR HEPATITIS C SCREENING TEST: ICD-10-CM

## 2023-02-03 DIAGNOSIS — E11.9 DIABETES MELLITUS WITHOUT COMPLICATION (HCC): ICD-10-CM

## 2023-02-03 LAB
ANION GAP SERPL CALCULATED.3IONS-SCNC: 3 MMOL/L (ref 4–13)
BUN SERPL-MCNC: 11 MG/DL (ref 5–25)
CALCIUM SERPL-MCNC: 9.3 MG/DL (ref 8.3–10.1)
CHLORIDE SERPL-SCNC: 105 MMOL/L (ref 96–108)
CHOLEST SERPL-MCNC: 159 MG/DL
CO2 SERPL-SCNC: 29 MMOL/L (ref 21–32)
CREAT SERPL-MCNC: 0.9 MG/DL (ref 0.6–1.3)
GFR SERPL CREATININE-BSD FRML MDRD: 97 ML/MIN/1.73SQ M
GLUCOSE P FAST SERPL-MCNC: 145 MG/DL (ref 65–99)
HDLC SERPL-MCNC: 49 MG/DL
LDLC SERPL CALC-MCNC: 95 MG/DL (ref 0–100)
NONHDLC SERPL-MCNC: 110 MG/DL
POTASSIUM SERPL-SCNC: 4.7 MMOL/L (ref 3.5–5.3)
SODIUM SERPL-SCNC: 137 MMOL/L (ref 135–147)
TRIGL SERPL-MCNC: 73 MG/DL

## 2023-02-03 NOTE — ED PROVIDER NOTES
History  Chief Complaint   Patient presents with   • Abdominal Pain     Abd pain for 1 week  Denies N/V/D  Pain gets worse when he eats  States that it feels like food gets stuck in his throat     26-year-old male, [place]-speaking  Offered  service but requested to use his wife for translation  States that he has been having about 1 week of abdominal pain in the epigastric area no associated nausea vomiting or diarrhea  No fevers or chills  Symptoms are made significantly worse with eating any type of food  He feels like he gets stuck in his esophagus  He states he had a history of umbilical hernia that required multiple surgeries for repairs  No sick contacts no recent travel  Nothing makes it significantly better  Prior to Admission Medications   Prescriptions Last Dose Informant Patient Reported? Taking?    Alcohol Swabs 70 % PADS   Yes No   Sig: As directed   Blood Glucose Monitoring Suppl (FREESTYLE LITE) FRANCIS   No No   Sig: by Other route daily   Diclofenac Sodium (VOLTAREN) 1 %   No No   Sig: Apply 2 g topically 4 (four) times a day   Jardiance 10 MG TABS tablet   No No   Sig: Take 1 tablet (10 mg total) by mouth daily   Lancets (FREESTYLE) lancets   No No   Sig: by Other route daily Use as instructed   clotrimazole (LOTRIMIN) 1 % cream   No No   Sig: Apply topically 2 (two) times a day   fluticasone (FLONASE) 50 mcg/act nasal spray   No No   Si spray into each nostril daily   glucose blood (FREESTYLE LITE) test strip   No No   Sig: TEST TWICE DAILY   hydrOXYzine pamoate (VISTARIL) 50 mg capsule   Yes No      Facility-Administered Medications: None       Past Medical History:   Diagnosis Date   • Anxiety    • Chronic back pain    • Depression    • Diabetes mellitus ([facility] [address] )        Past Surgical History:   Procedure Laterality Date   • CHOLECYSTECTOMY     • HERNIA REPAIR  2006       Family History   Problem Relation Age of Onset   • Diabetes Mother         Mellitus   • Osteoarthritis Maternal Grandmother    • Hypertension Maternal Grandfather    • Nephrolithiasis Family    • Cancer Maternal Uncle         Unknown   • No Known Problems Father      I have reviewed and agree with the history as documented  E-Cigarette/Vaping   • E-Cigarette Use Never User      E-Cigarette/Vaping Substances   • Nicotine No    • THC No    • CBD No    • Flavoring No    • Other No    • Unknown No      Social History     Tobacco Use   • Smoking status: Never   • Smokeless tobacco: Never   Vaping Use   • Vaping Use: Never used   Substance Use Topics   • Alcohol use: Not Currently   • Drug use: Never       Review of Systems   Constitutional: Negative  Negative for chills and fever  HENT: Negative  Negative for rhinorrhea, sore throat, trouble swallowing and voice change  Eyes: Negative  Negative for pain and visual disturbance  Respiratory: Negative  Negative for cough, shortness of breath and wheezing  Cardiovascular: Negative  Negative for chest pain and palpitations  Gastrointestinal: Positive for abdominal pain  Negative for diarrhea, nausea and vomiting  Genitourinary: Negative  Negative for dysuria and frequency  Musculoskeletal: Negative  Negative for neck pain and neck stiffness  Skin: Negative  Negative for rash  Neurological: Negative  Negative for dizziness, speech difficulty, weakness, light-headedness and numbness  Physical Exam  Physical Exam  Vitals and nursing note reviewed  Constitutional:       General: He is not in acute distress  Appearance: He is well-developed  HENT:      Head: Normocephalic and atraumatic  Eyes:      Conjunctiva/sclera: Conjunctivae normal       Pupils: Pupils are equal, round, and reactive to light  Neck:      Trachea: No tracheal deviation  Cardiovascular:      Rate and Rhythm: Normal rate and regular rhythm  Pulmonary:      Effort: Pulmonary effort is normal  No respiratory distress        Breath sounds: Normal breath sounds  No wheezing or rales  Abdominal:      General: Bowel sounds are normal  There is no distension  Palpations: Abdomen is soft  Tenderness: There is abdominal tenderness in the right upper quadrant, epigastric area and left upper quadrant  There is no guarding or rebound  Musculoskeletal:         General: No tenderness or deformity  Normal range of motion  Cervical back: Normal range of motion and neck supple  Skin:     General: Skin is warm and dry  Capillary Refill: Capillary refill takes less than 2 seconds  Findings: No rash  Neurological:      Mental Status: He is alert and oriented to person, place, and time     Psychiatric:         Behavior: Behavior normal          Vital Signs  ED Triage Vitals [02/02/23 1738]   Temperature Pulse Respirations Blood Pressure SpO2   98 3 °F (36 8 °C) 65 20 121/82 98 %      Temp Source Heart Rate Source Patient Position - Orthostatic VS BP Location FiO2 (%)   Tympanic Monitor Sitting Left arm --      Pain Score       --           Vitals:    02/02/23 1738   BP: 121/82   Pulse: 65   Patient Position - Orthostatic VS: Sitting         Visual Acuity      ED Medications  Medications   sodium chloride 0 9 % bolus 1,000 mL (0 mL Intravenous Stopped 2/2/23 1915)   Famotidine (PF) (PEPCID) injection 20 mg (20 mg Intravenous Given 2/2/23 1816)   iohexol (OMNIPAQUE) 350 MG/ML injection (SINGLE-DOSE) 100 mL (100 mL Intravenous Given 2/2/23 1855)       Diagnostic Studies  Results Reviewed     Procedure Component Value Units Date/Time    HS Troponin 0hr (reflex protocol) [[id number]]  (Normal) Collected: 02/02/23 1816    Lab Status: Final result Specimen: Blood from Arm, Left Updated: 02/02/23 1849     hs TnI 0hr 2 ng/L     Fingerstick Glucose (POCT) [[id number]]  (Normal) Collected: 02/02/23 1839    Lab Status: Final result Updated: 02/02/23 1841     POC Glucose 124 mg/dl     Urine Microscopic [[id number]]  (Abnormal) Collected: 02/02/23 1816    Lab Status: Final result Specimen: Urine, Other Updated: 02/02/23 1840     RBC, UA None Seen /hpf      WBC, UA None Seen /hpf      Epithelial Cells Occasional /hpf      Bacteria, UA Occasional /hpf      MUCUS THREADS Moderate    Lipase [[id number]]  (Abnormal) Collected: 02/02/23 1816    Lab Status: Final result Specimen: Blood from Arm, Left Updated: 02/02/23 1839     Lipase 432 u/L     Comprehensive metabolic panel [[id number]]  (Abnormal) Collected: 02/02/23 1816    Lab Status: Final result Specimen: Blood from Arm, Left Updated: 02/02/23 1839     Sodium 140 mmol/L      Potassium 4 5 mmol/L      Chloride 103 mmol/L      CO2 27 mmol/L      ANION GAP 10 mmol/L      BUN 13 mg/dL      Creatinine 0 79 mg/dL      Glucose 154 mg/dL      Calcium 9 7 mg/dL      AST 45 U/L      ALT 27 U/L      Alkaline Phosphatase 168 U/L      Total Protein 8 5 g/dL      Albumin 4 7 g/dL      Total Bilirubin 0 61 mg/dL      eGFR 102 ml/min/1 73sq m     Narrative:      [place] guidelines for Chronic Kidney Disease (CKD):   •  Stage 1 with normal or high GFR (GFR > 90 mL/min/1 73 square meters)  •  Stage 2 Mild CKD (GFR = 60-89 mL/min/1 73 square meters)  •  Stage 3A Moderate CKD (GFR = 45-59 mL/min/1 73 square meters)  •  Stage 3B Moderate CKD (GFR = 30-44 mL/min/1 73 square meters)  •  Stage 4 Severe CKD (GFR = 15-29 mL/min/1 73 square meters)  •  Stage 5 End Stage CKD (GFR <15 mL/min/1 73 square meters)  Note: GFR calculation is accurate only with a steady state creatinine    UA (URINE) with reflex to Scope [[id number]]  (Abnormal) Collected: 02/02/23 1816    Lab Status: Final result Specimen: Urine, Other Updated: 02/02/23 1832     Color, UA Yellow     Clarity, UA Clear     Specific Gravity, UA 1 010     pH, UA 7 0     Leukocytes, UA Negative     Nitrite, UA Negative     Protein, UA Negative mg/dl      Glucose, UA >=1000 (1%) mg/dl      Ketones, UA Negative mg/dl      Bilirubin, UA Negative     Occult Blood, UA Negative     UROBILINOGEN UA Negative mg/dL     CBC and differential [[id number]]  (Abnormal) Collected: 02/02/23 1816    Lab Status: Final result Specimen: Blood from Arm, Left Updated: 02/02/23 1831     WBC 10 66 Thousand/uL      RBC 5 11 Million/uL      Hemoglobin 15 4 g/dL      Hematocrit 46 6 %      MCV 91 fL      MCH 30 1 pg      MCHC 33 0 g/dL      RDW 13 0 %      MPV 10 6 fL      Platelets 588 Thousands/uL      nRBC 0 /100 WBCs      Neutrophils Relative 54 %      Immat GRANS % 0 %      Lymphocytes Relative 34 %      Monocytes Relative 7 %      Eosinophils Relative 4 %      Basophils Relative 1 %      Neutrophils Absolute 5 77 Thousands/µL      Immature Grans Absolute 0 04 Thousand/uL      Lymphocytes Absolute 3 66 Thousands/µL      Monocytes Absolute 0 75 Thousand/µL      Eosinophils Absolute 0 38 Thousand/µL      Basophils Absolute 0 06 Thousands/µL                  CT abdomen pelvis w contrast   Final Result by Akash Ordonez MD (02/02 2014)      No acute findings in the abdomen or pelvis  Large amount of stool throughout the colon              Workstation performed: [id number]                    Procedures  Procedures         ED Course  ED Course as of 02/03/23 1002   Thu Feb 02, 2023   1905 Procedure Note: EKG  Date/Time: 02/02/23 7:05 PM   Performed by: Bertha Lobato  Authorized by: Bertha Lobato  ECG interpreted by me, the ED Provider: yes   The EKG demonstrates:  Rate 56  Rhythm sinus bradycardia  QTc 409  No ST elevations/depressions                 HEART Risk Score    Flowsheet Row Most Recent Value   Heart Score Risk Calculator    History 1 Filed at: 02/02/2023 1906   ECG 0 Filed at: 02/02/2023 1906   Age 1 Filed at: 02/02/2023 1906   Risk Factors 1 Filed at: 02/02/2023 1906   Troponin 0 Filed at: 02/02/2023 1906   HEART Score 3 Filed at: 02/02/2023 1906                                      Medical Decision Making  39-year-old male who presented for concerns of epigastric pain and feelings of fullness after meals  Patient differential diagnosis includes but is not limited to gastroparesis, GERD, pancreatitis, bowel obstruction, viral syndromes  Patient's lab work was grossly normal with exception of mildly elevated lipase  CT scan did not show any signs of acute pancreatitis or chronic pancreatitis at this time  I did update both the patient and his wife on the test results the need for follow-up and repeat lab work to ensure that his lipase resolves as pancreatitis would fit with his symptoms of eating, epigastric pain and fullness  He has already had his gallbladder taken out surgically  His LFTs were okay, suspicion for a retained gallbladder stone at this time is very low  His abdomen is otherwise benign with no rigidity rebound or guarding  His pain was partially controlled in the emergency department, he was advised on some dietary modifications to try to improve things  I did review his social history and he is only a social drinker, his wife confirms that there is no concerns for alcohol abuse as a possible source for early pancreatitis  I have reviewed the patient's visit and any testing done in the emergency department  They have verbalized their understanding of any testing done today and have no further questions or concerns regarding their care in the emergency room  They will follow up with their primary care physician as well as with any specialist in their discharge instructions  Strict return precautions were discussed  Abdominal pain: acute illness or injury  Elevated lipase: acute illness or injury  Amount and/or Complexity of Data Reviewed  Labs: ordered  Radiology: ordered  Risk  Prescription drug management            Disposition  Final diagnoses:   Abdominal pain   Elevated lipase     Time reflects when diagnosis was documented in both MDM as applicable and the Disposition within this note     Time User Action Codes Description Comment    2/2/2023  8:18 PM Jessica Salazar Add [R10 9] Abdominal pain     2/2/2023  8:19 PM Jessica Salazar Add [R74 8] Elevated lipase       ED Disposition     ED Disposition   Discharge    Condition   Stable    Date/Time   Thu Feb 2, 2023  8:18 PM    [address] discharge to home/self care  Follow-up Information    None         Discharge Medication List as of 2/2/2023  8:21 PM      START taking these medications    Details   famotidine (PEPCID) 20 mg tablet Take 1 tablet (20 mg total) by mouth 2 (two) times a day, Starting Thu 2/2/2023, Normal         CONTINUE these medications which have NOT CHANGED    Details   Alcohol Swabs 70 % PADS As directed, Historical Med      Blood Glucose Monitoring Suppl (FREESTYLE LITE) FRANCIS by Other route daily, Starting Tue 11/13/2018, Normal      clotrimazole (LOTRIMIN) 1 % cream Apply topically 2 (two) times a day, Starting Thu 1/19/2023, Normal      Diclofenac Sodium (VOLTAREN) 1 % Apply 2 g topically 4 (four) times a day, Starting Wed 9/21/2022, Normal      fluticasone (FLONASE) 50 mcg/act nasal spray 1 spray into each nostril daily, Starting Thu 6/25/2020, Normal      glucose blood (FREESTYLE LITE) test strip TEST TWICE DAILY, Normal      hydrOXYzine pamoate (VISTARIL) 50 mg capsule Starting Sat 9/10/2022, Historical Med      Jardiance 10 MG TABS tablet Take 1 tablet (10 mg total) by mouth daily, Starting Thu 1/19/2023, Normal      Lancets (FREESTYLE) lancets by Other route daily Use as instructed, Starting Thu 6/20/2019, Normal             No discharge procedures on file      PDMP Review       Value Time User    PDMP Reviewed  Yes 6/8/2022 10:57 AM Maryam Wilhelm MD          ED Provider  Electronically Signed by           Kalani Salamanca DO  02/03/23 1002

## 2023-02-04 LAB
CREAT UR-MCNC: 155 MG/DL
EST. AVERAGE GLUCOSE BLD GHB EST-MCNC: 203 MG/DL
HBA1C MFR BLD: 8.7 %
HCV AB SER QL: NORMAL
HIV 1+2 AB+HIV1 P24 AG SERPL QL IA: NORMAL
HIV 2 AB SERPL QL IA: NORMAL
HIV1 AB SERPL QL IA: NORMAL
HIV1 P24 AG SERPL QL IA: NORMAL
MICROALBUMIN UR-MCNC: 13 MG/L (ref 0–20)
MICROALBUMIN/CREAT 24H UR: 8 MG/G CREATININE (ref 0–30)

## 2023-02-06 LAB
ATRIAL RATE: 56 BPM
P AXIS: -2 DEGREES
PR INTERVAL: 158 MS
QRS AXIS: 40 DEGREES
QRSD INTERVAL: 102 MS
QT INTERVAL: 424 MS
QTC INTERVAL: 409 MS
T WAVE AXIS: 34 DEGREES
VENTRICULAR RATE: 56 BPM

## 2023-03-20 PROBLEM — Z12.11 SCREENING FOR COLON CANCER: Status: RESOLVED | Noted: 2023-01-19 | Resolved: 2023-03-20

## 2023-03-22 ENCOUNTER — OFFICE VISIT (OUTPATIENT)
Dept: FAMILY MEDICINE CLINIC | Facility: CLINIC | Age: 53
End: 2023-03-22

## 2023-03-22 VITALS
DIASTOLIC BLOOD PRESSURE: 70 MMHG | SYSTOLIC BLOOD PRESSURE: 110 MMHG | WEIGHT: 197.8 LBS | HEIGHT: 69 IN | BODY MASS INDEX: 29.3 KG/M2 | OXYGEN SATURATION: 98 % | TEMPERATURE: 97.8 F | HEART RATE: 72 BPM | RESPIRATION RATE: 18 BRPM

## 2023-03-22 DIAGNOSIS — K59.09 OTHER CONSTIPATION: ICD-10-CM

## 2023-03-22 DIAGNOSIS — E11.9 DIABETES MELLITUS WITHOUT COMPLICATION (HCC): ICD-10-CM

## 2023-03-22 DIAGNOSIS — K21.9 GASTROESOPHAGEAL REFLUX DISEASE WITHOUT ESOPHAGITIS: ICD-10-CM

## 2023-03-22 DIAGNOSIS — Z00.00 ANNUAL PHYSICAL EXAM: Primary | ICD-10-CM

## 2023-03-22 DIAGNOSIS — E11.9 TYPE 2 DIABETES MELLITUS WITHOUT COMPLICATION, WITHOUT LONG-TERM CURRENT USE OF INSULIN (HCC): ICD-10-CM

## 2023-03-22 RX ORDER — DOCUSATE SODIUM 100 MG/1
100 CAPSULE, LIQUID FILLED ORAL DAILY
Qty: 30 CAPSULE | Refills: 1 | Status: SHIPPED | OUTPATIENT
Start: 2023-03-22

## 2023-03-22 RX ORDER — OMEPRAZOLE 40 MG/1
40 CAPSULE, DELAYED RELEASE ORAL
Qty: 30 CAPSULE | Refills: 2 | Status: SHIPPED | OUTPATIENT
Start: 2023-03-22 | End: 2023-09-18

## 2023-03-22 NOTE — ASSESSMENT & PLAN NOTE
- UTD with vaccines   - Needs colonoscopy however declines doing it now and will discuss further with me on follow up visit

## 2023-03-22 NOTE — PROGRESS NOTES
Name: Parvin Scruggs      : 1970      MRN: [id number]  Encounter Provider: Elías Denton MD  Encounter Date: 3/22/2023   Encounter department: [address] [place] [address]     1  Annual physical exam  Assessment & Plan:  - UTD with vaccines   - Needs colonoscopy however declines doing it now and will discuss further with me on follow up visit  2  Gastroesophageal reflux disease without esophagitis  Assessment & Plan:  - Start Omeprazole 40mg QD as directed   - No alarm symptoms at the moment  - Follow up in 4 weeks for already scheduled visit     Orders:  -     omeprazole (PriLOSEC) 40 MG capsule; Take 1 capsule (40 mg total) by mouth daily before breakfast    3  Other constipation  Assessment & Plan:  - As noted on recent CT scan   - Start docusate 100mg QD  - Advised to titrae to BID or every other day depending on stool burden  Orders:  -     docusate sodium (COLACE) 100 mg capsule; Take 1 capsule (100 mg total) by mouth in the morning    4  Type 2 diabetes mellitus without complication, without long-term current use of insulin ([facility])  -     Empagliflozin 25 MG TABS; Take 1 tablet (25 mg total) by mouth daily    5  Diabetes mellitus without complication [facility])  Assessment & Plan:    Lab Results   Component Value Date    HGBA1C 8 7 (H) 2023     - HBA1C worsened and uncontrolled   - Advised to adhere to strict dietary and lifestyle measures  - Will increase jardiance to 25mg QD           Subjective         This is a pleasant 48-year-old male with past medical history of type 2 diabetes with follow-up today for annual physical and ED follow-up  Patient reports that he is doing better since his ER visit about 1 month ago for epigastric pain  He was told that he may have pancreatitis but after reviewing all labs and imaging with him I have reassured him that he did not have pancreatitis    He reports that he continues to only be able to tolerate small meals, feels regurgitation of food in his throat, acid in his chest and epigastric pain  Otherwise, he reports that he is doing well and denies any other complaints  Review of Systems   Constitutional: Negative for fatigue and fever  HENT: Negative for congestion and sore throat  Eyes: Negative for visual disturbance  Respiratory: Negative for cough, shortness of breath and wheezing  Cardiovascular: Negative for chest pain, palpitations and leg swelling  Gastrointestinal: Positive for abdominal pain  Negative for anal bleeding, constipation, diarrhea, nausea and vomiting  Endocrine: Negative for cold intolerance and heat intolerance  Musculoskeletal: Negative for arthralgias and joint swelling  Skin: Negative for color change  Neurological: Negative for dizziness, seizures, syncope, weakness and light-headedness  Psychiatric/Behavioral: Negative for agitation, confusion, sleep disturbance and suicidal ideas         Current Outpatient Medications on File Prior to Visit   Medication Sig   • Alcohol Swabs 70 % PADS As directed   • Blood Glucose Monitoring Suppl (FREESTYLE LITE) FRANCIS by Other route daily   • clotrimazole (LOTRIMIN) 1 % cream Apply topically 2 (two) times a day   • Diclofenac Sodium (VOLTAREN) 1 % Apply 2 g topically 4 (four) times a day   • fluticasone (FLONASE) 50 mcg/act nasal spray 1 spray into each nostril daily   • glucose blood (FREESTYLE LITE) test strip TEST TWICE DAILY   • hydrOXYzine pamoate (VISTARIL) 50 mg capsule    • Lancets (FREESTYLE) lancets by Other route daily Use as instructed   • [DISCONTINUED] famotidine (PEPCID) 20 mg tablet Take 1 tablet (20 mg total) by mouth 2 (two) times a day   • [DISCONTINUED] Jardiance 10 MG TABS tablet Take 1 tablet (10 mg total) by mouth daily       Objective     /70 (BP Location: Left arm, Patient Position: Sitting, Cuff Size: Standard)   Pulse 72   Temp 97 8 °F (36 6 °C) (Temporal)   Resp 18   Ht 5' 9" (1 753 m)   Wt 89 7 kg (197 lb 12 8 oz)   SpO2 98%   BMI 29 21 kg/m²     Physical Exam  Constitutional:       Appearance: He is well-developed  HENT:      Head: Normocephalic and atraumatic  Eyes:      Conjunctiva/sclera: Conjunctivae normal       Pupils: Pupils are equal, round, and reactive to light  Neck:      Vascular: No JVD  Cardiovascular:      Rate and Rhythm: Normal rate and regular rhythm  Heart sounds: Normal heart sounds  No murmur heard  No friction rub  No gallop  Pulmonary:      Effort: Pulmonary effort is normal  No respiratory distress  Breath sounds: Normal breath sounds  No wheezing  Abdominal:      General: Bowel sounds are normal  There is no distension  Palpations: Abdomen is soft  Tenderness: There is abdominal tenderness in the epigastric area  Musculoskeletal:         General: Normal range of motion  Cervical back: Normal range of motion and neck supple  Skin:     General: Skin is warm and dry  Neurological:      Mental Status: He is alert and oriented to person, place, and time  Deep Tendon Reflexes: Reflexes are normal and symmetric  Psychiatric:         Behavior: Behavior normal          Thought Content:  Thought content normal          Judgment: Judgment normal        Elías Denton MD

## 2023-03-22 NOTE — ASSESSMENT & PLAN NOTE
- Start Omeprazole 40mg QD as directed   - No alarm symptoms at the moment  - Follow up in 4 weeks for already scheduled visit

## 2023-03-22 NOTE — ASSESSMENT & PLAN NOTE
- As noted on recent CT scan   - Start docusate 100mg QD  - Advised to titrae to BID or every other day depending on stool burden

## 2023-03-22 NOTE — ASSESSMENT & PLAN NOTE
Lab Results   Component Value Date    HGBA1C 8 7 (H) 02/03/2023     - HBA1C worsened and uncontrolled   - Advised to adhere to strict dietary and lifestyle measures  - Will increase jardiance to 25mg QD

## 2023-04-26 ENCOUNTER — OFFICE VISIT (OUTPATIENT)
Dept: FAMILY MEDICINE CLINIC | Facility: CLINIC | Age: 53
End: 2023-04-26

## 2023-04-26 VITALS
OXYGEN SATURATION: 95 % | TEMPERATURE: 97.7 F | WEIGHT: 206 LBS | RESPIRATION RATE: 18 BRPM | HEIGHT: 69 IN | HEART RATE: 75 BPM | SYSTOLIC BLOOD PRESSURE: 124 MMHG | BODY MASS INDEX: 30.51 KG/M2 | DIASTOLIC BLOOD PRESSURE: 76 MMHG

## 2023-04-26 DIAGNOSIS — E11.9 DIABETES MELLITUS WITHOUT COMPLICATION (HCC): Primary | ICD-10-CM

## 2023-04-26 DIAGNOSIS — Z12.11 ENCOUNTER FOR COLORECTAL CANCER SCREENING: ICD-10-CM

## 2023-04-26 DIAGNOSIS — H02.826 EYELID CYST, LEFT: ICD-10-CM

## 2023-04-26 DIAGNOSIS — K21.9 GASTROESOPHAGEAL REFLUX DISEASE WITHOUT ESOPHAGITIS: ICD-10-CM

## 2023-04-26 DIAGNOSIS — Z12.12 ENCOUNTER FOR COLORECTAL CANCER SCREENING: ICD-10-CM

## 2023-04-26 PROBLEM — Z00.00 ANNUAL PHYSICAL EXAM: Status: RESOLVED | Noted: 2021-09-15 | Resolved: 2023-04-26

## 2023-04-26 PROBLEM — K59.09 OTHER CONSTIPATION: Status: RESOLVED | Noted: 2023-03-22 | Resolved: 2023-04-26

## 2023-04-26 NOTE — PROGRESS NOTES
Name: Estuardo Larsen      : 1970      MRN: [id number]  Encounter Provider: Maria Del Carmen Shen MD  Encounter Date: 2023   Encounter department: [address] [place] [address]     1  Diabetes mellitus without complication [facility])  Assessment & Plan:    Lab Results   Component Value Date    HGBA1C 8 7 (H) 2023     - HBA1C worsened and uncontrolled   - Advised to adhere to strict dietary and lifestyle measures  - Continue jardiance to 25mg QD      2  Gastroesophageal reflux disease without esophagitis  Assessment & Plan:  - Symptoms much improved  - Continue PPI 40mg QD, plan to decrease to 20mg QD on next visit  3  Encounter for colorectal cancer screening  -     Ambulatory Referral to General Surgery; Future    4  Eyelid cyst, left  Assessment & Plan:  - Refer to ophthalmology for further evaluation  Orders:  -     Ambulatory Referral to Ophthalmology; Future         Subjective      Pleasant patient 27-year-old male with past medical his type 2 diabetes mellitus presents the office today for follow-up visit of his chronic conditions  Reports complaince with medications  States his acid reflux and constipation are much improved since starting medication for it  No other complaints  Review of Systems   Constitutional: Negative for fatigue and fever  HENT: Negative for congestion and sore throat  Eyes: Negative for visual disturbance  Respiratory: Negative for cough, shortness of breath and wheezing  Cardiovascular: Negative for chest pain, palpitations and leg swelling  Gastrointestinal: Negative for abdominal pain, anal bleeding, constipation, diarrhea, nausea and vomiting  Endocrine: Negative for cold intolerance and heat intolerance  Musculoskeletal: Negative for arthralgias and joint swelling  Skin: Negative for color change  Neurological: Negative for dizziness, seizures, syncope, weakness and light-headedness  "  Psychiatric/Behavioral: Negative for agitation, confusion, sleep disturbance and suicidal ideas  Current Outpatient Medications on File Prior to Visit   Medication Sig   • Alcohol Swabs 70 % PADS As directed   • Blood Glucose Monitoring Suppl (FREESTYLE LITE) FRANCIS by Other route daily   • clotrimazole (LOTRIMIN) 1 % cream Apply topically 2 (two) times a day   • Diclofenac Sodium (VOLTAREN) 1 % Apply 2 g topically 4 (four) times a day   • docusate sodium (COLACE) 100 mg capsule Take 1 capsule (100 mg total) by mouth in the morning   • Empagliflozin 25 MG TABS Take 1 tablet (25 mg total) by mouth daily   • fluticasone (FLONASE) 50 mcg/act nasal spray 1 spray into each nostril daily   • glucose blood (FREESTYLE LITE) test strip TEST TWICE DAILY   • hydrOXYzine pamoate (VISTARIL) 50 mg capsule    • Lancets (FREESTYLE) lancets by Other route daily Use as instructed   • omeprazole (PriLOSEC) 40 MG capsule Take 1 capsule (40 mg total) by mouth daily before breakfast       Objective     /76 (BP Location: Left arm, Patient Position: Sitting, Cuff Size: Standard)   Pulse 75   Temp 97 7 °F (36 5 °C) (Temporal)   Resp 18   Ht 5' 9\" (1 753 m)   Wt 93 4 kg (206 lb)   SpO2 95%   BMI 30 42 kg/m²     Physical Exam  Constitutional:       Appearance: He is well-developed  HENT:      Head: Normocephalic and atraumatic  Eyes:      Conjunctiva/sclera: Conjunctivae normal       Pupils: Pupils are equal, round, and reactive to light  Neck:      Vascular: No JVD  Cardiovascular:      Rate and Rhythm: Normal rate and regular rhythm  Heart sounds: Normal heart sounds  No murmur heard  No friction rub  No gallop  Pulmonary:      Effort: Pulmonary effort is normal  No respiratory distress  Breath sounds: Normal breath sounds  No wheezing  Abdominal:      General: Bowel sounds are normal  There is no distension  Palpations: Abdomen is soft  Tenderness: There is no abdominal tenderness   " Musculoskeletal:         General: Normal range of motion  Cervical back: Normal range of motion and neck supple  Skin:     General: Skin is warm and dry  Neurological:      Mental Status: He is alert and oriented to person, place, and time  Deep Tendon Reflexes: Reflexes are normal and symmetric  Psychiatric:         Behavior: Behavior normal          Thought Content:  Thought content normal          Judgment: Judgment normal        Clarence Davis MD

## 2023-04-26 NOTE — ASSESSMENT & PLAN NOTE
Lab Results   Component Value Date    HGBA1C 8 7 (H) 02/03/2023     - HBA1C worsened and uncontrolled   - Advised to adhere to strict dietary and lifestyle measures  - Continue jardiance to 25mg QD

## 2023-05-22 NOTE — PROGRESS NOTES
Assessment/Plan:   Jamia Nobles is a 46 y o male who is here for a: First screening Colonoscopy  Plan:   1  Colonoscopy for: Screening for Colon Cancer      Previous Colonoscopy and  Location of polyps if any:   First colonoscopy       Preoperative Clearance: None      ____________________________________________________    HPI:  Jamia Nobles is a 46 y o male who was referred for evaluation of    First Screening  Currently:     Symptoms include:  no abdominal pain, change in bowel habits, or black or bloody stools  Patient does state in the last 6 months he has had more constipation that has been treated with a stool softener about every 3 days  This constipation has improved since  Family history of colon cancer: None reported       Anticoagulation: none    Recent A1C: 6 7 three months ago      LABS:      Lab Results   Component Value Date    WBC 10 66 (H) 02/02/2023    HGB 15 4 02/02/2023    HCT 46 6 02/02/2023    MCV 91 02/02/2023     02/02/2023     Lab Results   Component Value Date    K 4 7 02/03/2023     02/03/2023    CO2 29 02/03/2023    BUN 11 02/03/2023    CREATININE 0 90 02/03/2023    GLUF 145 (H) 02/03/2023    CALCIUM 9 3 02/03/2023    AST 45 02/02/2023    ALT 27 02/02/2023    ALKPHOS 168 (H) 02/02/2023    EGFR 97 02/03/2023     Lab Results   Component Value Date    HGBA1C 8 7 (H) 02/03/2023     No results found for: INR, PROTIME      No orders to display         Review of Systems   Constitutional: Negative for chills, diaphoresis, fever and unexpected weight change  Respiratory: Negative for chest tightness and shortness of breath  Cardiovascular: Negative for chest pain, palpitations and leg swelling  Gastrointestinal: Negative for abdominal pain, anal bleeding, blood in stool, constipation, diarrhea, nausea, rectal pain and vomiting  Genitourinary: Negative for difficulty urinating and frequency     Skin: Negative for color change, rash and wound    Neurological: Negative for weakness and numbness  Hematological: Does not bruise/bleed easily  Psychiatric/Behavioral: Negative for confusion  The patient is not nervous/anxious  Imaging: No new pertinent imaging studies           Patient Active Problem List   Diagnosis   • Diabetes mellitus without complication (HCC)   • Chronic bilateral low back pain without sciatica   • Lumbar spondylosis   • Tinea pedis of both feet   • Encounter for colorectal cancer screening   • Gastroesophageal reflux disease without esophagitis   • Eyelid cyst, left         Allergies:  Morphine      Current Outpatient Medications:   •  Alcohol Swabs 70 % PADS, As directed, Disp: , Rfl:   •  Blood Glucose Monitoring Suppl (FREESTYLE LITE) FRANCIS, by Other route daily, Disp: 1 each, Rfl: 0  •  clotrimazole (LOTRIMIN) 1 % cream, Apply topically 2 (two) times a day, Disp: 60 g, Rfl: 0  •  Diclofenac Sodium (VOLTAREN) 1 %, Apply 2 g topically 4 (four) times a day, Disp: 150 g, Rfl: 2  •  docusate sodium (COLACE) 100 mg capsule, Take 1 capsule (100 mg total) by mouth in the morning, Disp: 30 capsule, Rfl: 1  •  Empagliflozin 25 MG TABS, Take 1 tablet (25 mg total) by mouth daily, Disp: 90 tablet, Rfl: 3  •  fluticasone (FLONASE) 50 mcg/act nasal spray, 1 spray into each nostril daily, Disp: 1 Bottle, Rfl: 1  •  glucose blood (FREESTYLE LITE) test strip, TEST TWICE DAILY, Disp: 100 each, Rfl: 5  •  hydrOXYzine pamoate (VISTARIL) 50 mg capsule, , Disp: , Rfl:   •  omeprazole (PriLOSEC) 40 MG capsule, Take 1 capsule (40 mg total) by mouth daily before breakfast, Disp: 30 capsule, Rfl: 2  •  Lancets (FREESTYLE) lancets, by Other route daily Use as instructed, Disp: 100 each, Rfl: 5    Past Medical History:   Diagnosis Date   • Anxiety    • Chronic back pain    • Depression    • Diabetes mellitus ([facility] [address] )        Past Surgical History:   Procedure Laterality Date   • CHOLECYSTECTOMY  2007   • HERNIA REPAIR  2006       Family History Problem Relation Age of Onset   • Diabetes Mother         Mellitus   • Osteoarthritis Maternal Grandmother    • Hypertension Maternal Grandfather    • Nephrolithiasis Family    • Cancer Maternal Uncle         Unknown   • No Known Problems Father        Social History     Socioeconomic History   • Marital status: Single     Spouse name: None   • Number of children: None   • Years of education: None   • Highest education level: None   Occupational History   • None   Tobacco Use   • Smoking status: Never   • Smokeless tobacco: Never   Vaping Use   • Vaping Use: Never used   Substance and Sexual Activity   • Alcohol use: Not Currently   • Drug use: Never   • Sexual activity: Yes   Other Topics Concern   • None   Social History Narrative   • None     Social Determinants of Health     Financial Resource Strain: Low Risk    • Difficulty of Paying Living Expenses: Not hard at all   Food Insecurity: No Food Insecurity   • Worried About Running Out of Food in the Last Year: Never true   • Ran Out of Food in the Last Year: Never true   Transportation Needs: No Transportation Needs   • Lack of Transportation (Medical): No   • Lack of Transportation (Non-Medical): No   Physical Activity: Not on file   Stress: Not on file   Social Connections: Not on file   Intimate Partner Violence: Not on file   Housing Stability: Not on file       Exam:   Vitals:    05/23/23 0946   BP: 121/79   Pulse: 64   Resp: 16   Temp: 97 8 °F (36 6 °C)           ______________________________________________________    Physical Exam  Vitals and nursing note reviewed  Exam conducted with a chaperone present  Constitutional:       General: He is not in acute distress  Appearance: Normal appearance  He is normal weight  He is not ill-appearing, toxic-appearing or diaphoretic  HENT:      Head: Normocephalic and atraumatic  Cardiovascular:      Rate and Rhythm: Normal rate and regular rhythm  Pulses: Normal pulses        Heart sounds: Normal heart sounds  No murmur heard  No gallop  Pulmonary:      Effort: Pulmonary effort is normal       Breath sounds: Normal breath sounds  Abdominal:      General: Abdomen is flat  There is no distension  Palpations: Abdomen is soft  There is no mass  Tenderness: There is no abdominal tenderness  There is no guarding or rebound  Hernia: No hernia is present  Musculoskeletal:         General: No swelling or tenderness  Lymphadenopathy:      Cervical: No cervical adenopathy  Skin:     General: Skin is warm and dry  Neurological:      General: No focal deficit present  Mental Status: He is alert and oriented to person, place, and time  Mental status is at baseline  Psychiatric:         Mood and Affect: Mood normal          Behavior: Behavior normal          Thought Content: Thought content normal          Judgment: Judgment normal            Informed consent for procedure was personally discussed, reviewed, and signed by Dr Usha Huston  Discussion by Dr Usha Huston was carried out regarding risks, benefits, and alternatives with the patient  Risks include but are not limited to:  bleeding, infection, and delayed wound healing or an open wound, pulmonary embolus, leaks from bowel or bile ducts or other viscus, transfusions, death  Discussed in further detail the more common complications and their rates of occurrence   was used if necessary  Patient expressed understanding of the issues discussed and wished/consented to proceed  All questions were answered by Dr Usha Huston  Discussion performed between patient and the provider signing below       Signature:   Chelsea Pollock  Date: 5/23/2023 Time: 10:14 AM

## 2023-05-23 ENCOUNTER — CONSULT (OUTPATIENT)
Dept: SURGERY | Facility: CLINIC | Age: 53
End: 2023-05-23

## 2023-05-23 VITALS
HEIGHT: 69 IN | WEIGHT: 206 LBS | BODY MASS INDEX: 30.51 KG/M2 | DIASTOLIC BLOOD PRESSURE: 79 MMHG | SYSTOLIC BLOOD PRESSURE: 121 MMHG | HEART RATE: 64 BPM | RESPIRATION RATE: 16 BRPM | TEMPERATURE: 97.8 F

## 2023-05-23 DIAGNOSIS — Z12.11 ENCOUNTER FOR COLORECTAL CANCER SCREENING: Primary | ICD-10-CM

## 2023-05-23 DIAGNOSIS — Z12.12 ENCOUNTER FOR COLORECTAL CANCER SCREENING: Primary | ICD-10-CM

## 2023-05-24 DIAGNOSIS — Z12.12 ENCOUNTER FOR COLORECTAL CANCER SCREENING: Primary | ICD-10-CM

## 2023-05-24 DIAGNOSIS — Z12.11 ENCOUNTER FOR COLORECTAL CANCER SCREENING: Primary | ICD-10-CM

## 2023-05-24 RX ORDER — SODIUM, POTASSIUM,MAG SULFATES 17.5-3.13G
2 SOLUTION, RECONSTITUTED, ORAL ORAL 2 TIMES DAILY
Qty: 177 ML | Refills: 0 | Status: SHIPPED | OUTPATIENT
Start: 2023-05-24 | End: 2023-07-27

## 2023-06-20 DIAGNOSIS — K21.9 GASTROESOPHAGEAL REFLUX DISEASE WITHOUT ESOPHAGITIS: ICD-10-CM

## 2023-06-20 RX ORDER — OMEPRAZOLE 40 MG/1
CAPSULE, DELAYED RELEASE ORAL
Qty: 30 CAPSULE | Refills: 2 | Status: SHIPPED | OUTPATIENT
Start: 2023-06-20

## 2023-06-25 PROBLEM — Z12.12 ENCOUNTER FOR COLORECTAL CANCER SCREENING: Status: RESOLVED | Noted: 2023-01-19 | Resolved: 2023-06-25

## 2023-06-25 PROBLEM — Z12.11 ENCOUNTER FOR COLORECTAL CANCER SCREENING: Status: RESOLVED | Noted: 2023-01-19 | Resolved: 2023-06-25

## 2023-07-19 RX ORDER — SODIUM CHLORIDE 9 MG/ML
125 INJECTION, SOLUTION INTRAVENOUS CONTINUOUS
Status: CANCELLED | OUTPATIENT
Start: 2023-07-19

## 2023-07-21 ENCOUNTER — ANESTHESIA (OUTPATIENT)
Dept: GASTROENTEROLOGY | Facility: HOSPITAL | Age: 53
End: 2023-07-21

## 2023-07-21 ENCOUNTER — HOSPITAL ENCOUNTER (OUTPATIENT)
Dept: GASTROENTEROLOGY | Facility: HOSPITAL | Age: 53
Setting detail: OUTPATIENT SURGERY
End: 2023-07-21
Attending: SURGERY
Payer: MEDICARE

## 2023-07-21 ENCOUNTER — ANESTHESIA EVENT (OUTPATIENT)
Dept: GASTROENTEROLOGY | Facility: HOSPITAL | Age: 53
End: 2023-07-21

## 2023-07-21 VITALS
WEIGHT: 206 LBS | DIASTOLIC BLOOD PRESSURE: 67 MMHG | RESPIRATION RATE: 12 BRPM | HEIGHT: 69 IN | SYSTOLIC BLOOD PRESSURE: 100 MMHG | TEMPERATURE: 98 F | BODY MASS INDEX: 30.51 KG/M2 | OXYGEN SATURATION: 98 % | HEART RATE: 66 BPM

## 2023-07-21 DIAGNOSIS — Z12.11 ENCOUNTER FOR COLORECTAL CANCER SCREENING: ICD-10-CM

## 2023-07-21 DIAGNOSIS — Z12.12 ENCOUNTER FOR COLORECTAL CANCER SCREENING: ICD-10-CM

## 2023-07-21 LAB — GLUCOSE SERPL-MCNC: 115 MG/DL (ref 65–140)

## 2023-07-21 PROCEDURE — 88305 TISSUE EXAM BY PATHOLOGIST: CPT | Performed by: STUDENT IN AN ORGANIZED HEALTH CARE EDUCATION/TRAINING PROGRAM

## 2023-07-21 PROCEDURE — 45385 COLONOSCOPY W/LESION REMOVAL: CPT | Performed by: SURGERY

## 2023-07-21 PROCEDURE — 82948 REAGENT STRIP/BLOOD GLUCOSE: CPT

## 2023-07-21 RX ORDER — LIDOCAINE HYDROCHLORIDE 20 MG/ML
INJECTION, SOLUTION EPIDURAL; INFILTRATION; INTRACAUDAL; PERINEURAL AS NEEDED
Status: DISCONTINUED | OUTPATIENT
Start: 2023-07-21 | End: 2023-07-21

## 2023-07-21 RX ORDER — SODIUM CHLORIDE 9 MG/ML
125 INJECTION, SOLUTION INTRAVENOUS CONTINUOUS
Status: DISCONTINUED | OUTPATIENT
Start: 2023-07-21 | End: 2023-07-25 | Stop reason: HOSPADM

## 2023-07-21 RX ORDER — PROPOFOL 10 MG/ML
INJECTION, EMULSION INTRAVENOUS AS NEEDED
Status: DISCONTINUED | OUTPATIENT
Start: 2023-07-21 | End: 2023-07-21

## 2023-07-21 RX ADMIN — PROPOFOL 30 MG: 10 INJECTION, EMULSION INTRAVENOUS at 11:46

## 2023-07-21 RX ADMIN — PROPOFOL 30 MG: 10 INJECTION, EMULSION INTRAVENOUS at 11:58

## 2023-07-21 RX ADMIN — LIDOCAINE HYDROCHLORIDE 80 MG: 20 INJECTION, SOLUTION EPIDURAL; INFILTRATION; INTRACAUDAL at 11:41

## 2023-07-21 RX ADMIN — PROPOFOL 150 MG: 10 INJECTION, EMULSION INTRAVENOUS at 11:41

## 2023-07-21 RX ADMIN — SODIUM CHLORIDE 125 ML/HR: 0.9 INJECTION, SOLUTION INTRAVENOUS at 11:15

## 2023-07-21 RX ADMIN — PROPOFOL 30 MG: 10 INJECTION, EMULSION INTRAVENOUS at 11:43

## 2023-07-21 RX ADMIN — PROPOFOL 30 MG: 10 INJECTION, EMULSION INTRAVENOUS at 12:01

## 2023-07-21 RX ADMIN — PROPOFOL 30 MG: 10 INJECTION, EMULSION INTRAVENOUS at 11:55

## 2023-07-21 RX ADMIN — PROPOFOL 30 MG: 10 INJECTION, EMULSION INTRAVENOUS at 11:51

## 2023-07-21 NOTE — ANESTHESIA PREPROCEDURE EVALUATION
Procedure:  COLONOSCOPY    Relevant Problems   ANESTHESIA (within normal limits)      CARDIO (within normal limits)      GI/HEPATIC   (+) Gastroesophageal reflux disease without esophagitis      MUSCULOSKELETAL   (+) Chronic bilateral low back pain without sciatica   (+) Lumbar spondylosis      NEURO/PSYCH   (+) Chronic bilateral low back pain without sciatica      PULMONARY (within normal limits)      Endocrine   (+) Diabetes mellitus without complication (HCC)        Physical Exam    Airway    Mallampati score: II  TM Distance: >3 FB  Neck ROM: full     Dental   No notable dental hx     Cardiovascular  Cardiovascular exam normal    Pulmonary  Pulmonary exam normal     Other Findings        Anesthesia Plan  ASA Score- 2     Anesthesia Type- IV sedation with anesthesia with ASA Monitors. Additional Monitors:   Airway Plan:           Plan Factors-    Chart reviewed. Patient summary reviewed. Induction- intravenous. Postoperative Plan-     Informed Consent- Anesthetic plan and risks discussed with patient.

## 2023-07-21 NOTE — H&P
H&P EXAM - Outpatient Endoscopy   Carver Crigler MelCrystal 46 y.o. male MRN: [id number]    [address] [place] [address] GI LAB PRE/POST   Encounter: [id number]        Impression: Screening colonoscopy    Plan: Screening colonoscopy    Chief Complaint: Screening colonoscopy    Physical Exam: AAOx3   Chest: CTA   Heart: S1S2

## 2023-07-21 NOTE — ANESTHESIA POSTPROCEDURE EVALUATION
Post-Op Assessment Note    CV Status:  Stable    Pain management: adequate     Mental Status:  Alert and awake   Hydration Status:  Euvolemic   PONV Controlled:  Controlled   Airway Patency:  Patent      Post Op Vitals Reviewed: Yes      Staff: Anesthesiologist         No notable events documented.   /67   Pulse 66   Temp 98 °F (36.7 °C) (Temporal)   Resp 12   Ht 5' 9" (1.753 m)   Wt 93.4 kg (206 lb)   SpO2 98%   BMI 30.42 kg/m²   BP      Temp      Pulse     Resp      SpO2

## 2023-07-25 PROCEDURE — 88305 TISSUE EXAM BY PATHOLOGIST: CPT | Performed by: STUDENT IN AN ORGANIZED HEALTH CARE EDUCATION/TRAINING PROGRAM

## 2023-07-27 ENCOUNTER — OFFICE VISIT (OUTPATIENT)
Dept: FAMILY MEDICINE CLINIC | Facility: CLINIC | Age: 53
End: 2023-07-27

## 2023-07-27 ENCOUNTER — TELEPHONE (OUTPATIENT)
Dept: SURGERY | Facility: HOSPITAL | Age: 53
End: 2023-07-27

## 2023-07-27 VITALS
OXYGEN SATURATION: 98 % | TEMPERATURE: 97.5 F | BODY MASS INDEX: 30.21 KG/M2 | SYSTOLIC BLOOD PRESSURE: 120 MMHG | DIASTOLIC BLOOD PRESSURE: 70 MMHG | HEIGHT: 69 IN | WEIGHT: 204 LBS | RESPIRATION RATE: 16 BRPM | HEART RATE: 69 BPM

## 2023-07-27 DIAGNOSIS — E11.9 DIABETES MELLITUS WITHOUT COMPLICATION (HCC): Primary | ICD-10-CM

## 2023-07-27 DIAGNOSIS — M54.50 CHRONIC BILATERAL LOW BACK PAIN WITHOUT SCIATICA: ICD-10-CM

## 2023-07-27 DIAGNOSIS — G89.29 CHRONIC BILATERAL LOW BACK PAIN WITHOUT SCIATICA: ICD-10-CM

## 2023-07-27 DIAGNOSIS — E78.2 MIXED HYPERLIPIDEMIA: ICD-10-CM

## 2023-07-27 DIAGNOSIS — K21.9 GASTROESOPHAGEAL REFLUX DISEASE WITHOUT ESOPHAGITIS: ICD-10-CM

## 2023-07-27 DIAGNOSIS — H02.826 EYELID CYST, LEFT: ICD-10-CM

## 2023-07-27 PROCEDURE — 99214 OFFICE O/P EST MOD 30 MIN: CPT | Performed by: FAMILY MEDICINE

## 2023-07-27 NOTE — PROGRESS NOTES
Name: Bernabe Kay      : 1970      MRN: [id number]  Encounter Provider: Cely Licona MD  Encounter Date: 2023   Encounter department: [address] [facility],[address]     1. Diabetes mellitus without complication [facility])  Assessment & Plan:    Lab Results   Component Value Date    HGBA1C 8.7 (H) 2023     - HBA1C worsened and uncontrolled   - Advised to adhere to strict dietary and lifestyle measures  - Continue jardiance to 25mg QD  - Repeat HBA1C    Orders:  -     HEMOGLOBIN A1C W/ EAG ESTIMATION; Future  -     Comprehensive metabolic panel; Future    2. Chronic bilateral low back pain without sciatica  Assessment & Plan:  - Not interested in following with pain management anymore. - Is not interested in further treatments or imaging at the moment. - Will continue to follow      3. Eyelid cyst, left  Assessment & Plan:  - Refer to ophthalmology for further evaluation. Orders:  -     Ambulatory Referral to Ophthalmology; Future    4. Mixed hyperlipidemia  Assessment & Plan:  - Is not interested in statin therapy. - Will continue to monitor symptoms. Orders:  -     Lipid panel; Future    5. Gastroesophageal reflux disease without esophagitis  Assessment & Plan:  - Symptoms much improved. - Continue PPI 40mg QD, plan to decrease to 20mg QD on next visit. Subjective      Pleasant patient 70-year-old male with past medical his type 2 diabetes mellitus presents the office today for follow-up visit of his chronic conditions. Reports complaince with medications. States his acid reflux and constipation are much improved since starting medication for it. No other complaints. Review of Systems   Constitutional: Negative for fever. HENT: Negative for congestion and sore throat. Eyes: Negative for visual disturbance. Respiratory: Negative for cough, shortness of breath and wheezing.     Cardiovascular: Negative for palpitations and leg swelling. Gastrointestinal: Negative for abdominal pain, anal bleeding, constipation, diarrhea, nausea and vomiting. Endocrine: Negative for cold intolerance and heat intolerance. Musculoskeletal: Negative for arthralgias and joint swelling. Skin: Negative for color change. Neurological: Negative for syncope and light-headedness. Psychiatric/Behavioral: Negative for agitation, sleep disturbance and suicidal ideas.        Current Outpatient Medications on File Prior to Visit   Medication Sig   • Alcohol Swabs 70 % PADS As directed   • Blood Glucose Monitoring Suppl (FREESTYLE LITE) FRANCIS by Other route daily   • clotrimazole (LOTRIMIN) 1 % cream Apply topically 2 (two) times a day   • Diclofenac Sodium (VOLTAREN) 1 % Apply 2 g topically 4 (four) times a day   • docusate sodium (COLACE) 100 mg capsule Take 1 capsule (100 mg total) by mouth in the morning   • Empagliflozin 25 MG TABS Take 1 tablet (25 mg total) by mouth daily   • fluticasone (FLONASE) 50 mcg/act nasal spray 1 spray into each nostril daily   • glucose blood (FREESTYLE LITE) test strip TEST TWICE DAILY   • hydrOXYzine pamoate (VISTARIL) 50 mg capsule    • Lancets (FREESTYLE) lancets by Other route daily Use as instructed   • omeprazole (PriLOSEC) 40 MG capsule TAKE 1 CAPSULE BY MOUTH EVERY DAY BEFORE BREAKFAST   • [DISCONTINUED] Na Sulfate-K Sulfate-Mg Sulf (Suprep Bowel Prep Kit) 17.5-3.13-1.6 GM/177ML SOLN Take 2 Bottles by mouth 2 (two) times a day       Objective     /70 (BP Location: Left arm, Patient Position: Sitting, Cuff Size: Standard)   Pulse 69   Temp 97.5 °F (36.4 °C) (Temporal)   Resp 16   Ht 5' 9" (1.753 m)   Wt 92.5 kg (204 lb)   SpO2 98%   BMI 30.13 kg/m²     Physical Exam  Debora Martinez MD

## 2023-07-27 NOTE — ASSESSMENT & PLAN NOTE
- Not interested in following with pain management anymore. - Is not interested in further treatments or imaging at the moment.    - Will continue to follow

## 2023-07-27 NOTE — TELEPHONE ENCOUNTER
Spoke with patients wife, re: results of pathology, should have repeat colonoscopy in 5 years. If he has any questions to please call our office.

## 2023-07-27 NOTE — ASSESSMENT & PLAN NOTE
Lab Results   Component Value Date    HGBA1C 8.7 (H) 02/03/2023     - HBA1C worsened and uncontrolled   - Advised to adhere to strict dietary and lifestyle measures  - Continue jardiance to 25mg QD  - Repeat HBA1C

## 2023-08-04 ENCOUNTER — APPOINTMENT (OUTPATIENT)
Dept: LAB | Facility: HOSPITAL | Age: 53
End: 2023-08-04
Payer: MEDICARE

## 2023-08-04 DIAGNOSIS — E11.9 DIABETES MELLITUS WITHOUT COMPLICATION (HCC): ICD-10-CM

## 2023-08-04 DIAGNOSIS — E78.2 MIXED HYPERLIPIDEMIA: ICD-10-CM

## 2023-08-04 LAB
ALBUMIN SERPL BCP-MCNC: 4.5 G/DL (ref 3.5–5)
ALP SERPL-CCNC: 89 U/L (ref 34–104)
ALT SERPL W P-5'-P-CCNC: 14 U/L (ref 7–52)
ANION GAP SERPL CALCULATED.3IONS-SCNC: 9 MMOL/L
AST SERPL W P-5'-P-CCNC: 27 U/L (ref 13–39)
BILIRUB SERPL-MCNC: 1.02 MG/DL (ref 0.2–1)
BUN SERPL-MCNC: 14 MG/DL (ref 5–25)
CALCIUM SERPL-MCNC: 9.5 MG/DL (ref 8.4–10.2)
CHLORIDE SERPL-SCNC: 104 MMOL/L (ref 96–108)
CHOLEST SERPL-MCNC: 170 MG/DL
CO2 SERPL-SCNC: 28 MMOL/L (ref 21–32)
CREAT SERPL-MCNC: 0.76 MG/DL (ref 0.6–1.3)
EST. AVERAGE GLUCOSE BLD GHB EST-MCNC: 194 MG/DL
GFR SERPL CREATININE-BSD FRML MDRD: 103 ML/MIN/1.73SQ M
GLUCOSE P FAST SERPL-MCNC: 139 MG/DL (ref 65–99)
HBA1C MFR BLD: 8.4 %
HDLC SERPL-MCNC: 55 MG/DL
LDLC SERPL CALC-MCNC: 100 MG/DL (ref 0–100)
NONHDLC SERPL-MCNC: 115 MG/DL
POTASSIUM SERPL-SCNC: 4.1 MMOL/L (ref 3.5–5.3)
PROT SERPL-MCNC: 7.4 G/DL (ref 6.4–8.4)
SODIUM SERPL-SCNC: 141 MMOL/L (ref 135–147)
TRIGL SERPL-MCNC: 75 MG/DL

## 2023-08-04 PROCEDURE — 80053 COMPREHEN METABOLIC PANEL: CPT

## 2023-08-04 PROCEDURE — 83036 HEMOGLOBIN GLYCOSYLATED A1C: CPT

## 2023-08-04 PROCEDURE — 36415 COLL VENOUS BLD VENIPUNCTURE: CPT

## 2023-08-04 PROCEDURE — 80061 LIPID PANEL: CPT

## 2023-08-09 ENCOUNTER — OFFICE VISIT (OUTPATIENT)
Dept: FAMILY MEDICINE CLINIC | Facility: CLINIC | Age: 53
End: 2023-08-09

## 2023-08-09 VITALS
RESPIRATION RATE: 16 BRPM | DIASTOLIC BLOOD PRESSURE: 62 MMHG | OXYGEN SATURATION: 98 % | HEIGHT: 69 IN | SYSTOLIC BLOOD PRESSURE: 108 MMHG | BODY MASS INDEX: 30.51 KG/M2 | TEMPERATURE: 97.4 F | WEIGHT: 206 LBS | HEART RATE: 85 BPM

## 2023-08-09 DIAGNOSIS — E78.2 MIXED HYPERLIPIDEMIA: ICD-10-CM

## 2023-08-09 DIAGNOSIS — E11.9 DIABETES MELLITUS WITHOUT COMPLICATION (HCC): Primary | ICD-10-CM

## 2023-08-09 DIAGNOSIS — K21.9 GASTROESOPHAGEAL REFLUX DISEASE WITHOUT ESOPHAGITIS: ICD-10-CM

## 2023-08-09 DIAGNOSIS — B35.3 TINEA PEDIS OF BOTH FEET: ICD-10-CM

## 2023-08-09 PROCEDURE — 99214 OFFICE O/P EST MOD 30 MIN: CPT | Performed by: FAMILY MEDICINE

## 2023-08-09 RX ORDER — CLOTRIMAZOLE 1 %
CREAM (GRAM) TOPICAL 2 TIMES DAILY
Qty: 60 G | Refills: 0 | Status: SHIPPED | OUTPATIENT
Start: 2023-08-09

## 2023-08-09 NOTE — ASSESSMENT & PLAN NOTE
Lab Results   Component Value Date    HGBA1C 8.4 (H) 08/04/2023     - HBA1C worsened and uncontrolled   - Advised to adhere to strict dietary and lifestyle measures  - Continue jardiance to 25mg QD  - Repeat HBA1C  - Refuses statin therapy

## 2023-08-09 NOTE — PROGRESS NOTES
Name: Connie Valencia      : 1970      MRN: [id number]  Encounter Provider: Ankush Lerner MD  Encounter Date: 2023   Encounter department: [address] [facility],[address]     1. Diabetes mellitus without complication [facility])  Assessment & Plan:    Lab Results   Component Value Date    HGBA1C 8.4 (H) 2023     - HBA1C worsened and uncontrolled   - Advised to adhere to strict dietary and lifestyle measures  - Continue jardiance to 25mg QD  - Repeat HBA1C  - Refuses statin therapy    Orders:  -     HEMOGLOBIN A1C W/ EAG ESTIMATION; Future    2. Gastroesophageal reflux disease without esophagitis  Assessment & Plan:  - Symptoms much improved. - Continue PPI 40mg QD, plan to decrease to 20mg QD on next visit. 3. Mixed hyperlipidemia  Assessment & Plan:  - Lipid panel reviewed  - Is not interested in statin therapy. - Will continue to monitor lipid panel. Orders:  -     Lipid panel; Future    4. Tinea pedis of both feet  -     clotrimazole (LOTRIMIN) 1 % cream; Apply topically 2 (two) times a day         Subjective      Pleasant patient 62-year-old male with past medical his type 2 diabetes mellitus presents the office today for follow-up visit of his chronic conditions. Reports complaince with medications. States he has been eating deserts and sweet food recently that could explain his elevated blood sugar. No other complaints. Review of Systems   Constitutional: Negative for fever. HENT: Negative for congestion and sore throat. Eyes: Negative for visual disturbance. Respiratory: Negative for cough and wheezing. Cardiovascular: Negative for leg swelling. Gastrointestinal: Negative for abdominal pain, anal bleeding, constipation, diarrhea, nausea and vomiting. Endocrine: Negative for cold intolerance and heat intolerance. Musculoskeletal: Negative for arthralgias and joint swelling. Skin: Negative for color change. Neurological: Negative for syncope and light-headedness. Psychiatric/Behavioral: Negative for agitation, sleep disturbance and suicidal ideas. Current Outpatient Medications on File Prior to Visit   Medication Sig   • Alcohol Swabs 70 % PADS As directed   • Blood Glucose Monitoring Suppl (FREESTYLE LITE) FRANCIS by Other route daily   • Diclofenac Sodium (VOLTAREN) 1 % Apply 2 g topically 4 (four) times a day   • docusate sodium (COLACE) 100 mg capsule Take 1 capsule (100 mg total) by mouth in the morning   • Empagliflozin 25 MG TABS Take 1 tablet (25 mg total) by mouth daily   • fluticasone (FLONASE) 50 mcg/act nasal spray 1 spray into each nostril daily   • glucose blood (FREESTYLE LITE) test strip TEST TWICE DAILY   • hydrOXYzine pamoate (VISTARIL) 50 mg capsule    • Lancets (FREESTYLE) lancets by Other route daily Use as instructed   • omeprazole (PriLOSEC) 40 MG capsule TAKE 1 CAPSULE BY MOUTH EVERY DAY BEFORE BREAKFAST   • [DISCONTINUED] clotrimazole (LOTRIMIN) 1 % cream Apply topically 2 (two) times a day       Objective     /62 (BP Location: Left arm, Patient Position: Sitting, Cuff Size: Large)   Pulse 85   Temp (!) 97.4 °F (36.3 °C) (Temporal)   Resp 16   Ht 5' 9" (1.753 m)   Wt 93.4 kg (206 lb)   SpO2 98%   BMI 30.42 kg/m²     Physical Exam  Constitutional:       Appearance: He is well-developed. HENT:      Head: Normocephalic and atraumatic. Eyes:      Conjunctiva/sclera: Conjunctivae normal.      Pupils: Pupils are equal, round, and reactive to light. Neck:      Vascular: No JVD. Cardiovascular:      Rate and Rhythm: Normal rate and regular rhythm. Heart sounds: Normal heart sounds. No murmur heard. No friction rub. No gallop. Pulmonary:      Effort: Pulmonary effort is normal. No respiratory distress. Breath sounds: Normal breath sounds. No wheezing. Abdominal:      General: Bowel sounds are normal. There is no distension. Palpations: Abdomen is soft. Tenderness: There is no abdominal tenderness. Musculoskeletal:         General: Normal range of motion. Cervical back: Normal range of motion and neck supple. Skin:     General: Skin is warm and dry. Neurological:      Mental Status: He is alert and oriented to person, place, and time. Deep Tendon Reflexes: Reflexes are normal and symmetric. Psychiatric:         Behavior: Behavior normal.         Thought Content:  Thought content normal.         Judgment: Judgment normal.       Bill Newman MD

## 2023-08-09 NOTE — ASSESSMENT & PLAN NOTE
- Lipid panel reviewed  - Is not interested in statin therapy. - Will continue to monitor lipid panel.

## 2023-09-20 ENCOUNTER — CONSULT (OUTPATIENT)
Dept: FAMILY MEDICINE CLINIC | Facility: CLINIC | Age: 53
End: 2023-09-20

## 2023-09-20 VITALS
HEART RATE: 89 BPM | BODY MASS INDEX: 30.21 KG/M2 | RESPIRATION RATE: 16 BRPM | TEMPERATURE: 98.7 F | SYSTOLIC BLOOD PRESSURE: 116 MMHG | OXYGEN SATURATION: 97 % | DIASTOLIC BLOOD PRESSURE: 80 MMHG | WEIGHT: 204 LBS | HEIGHT: 69 IN

## 2023-09-20 DIAGNOSIS — H02.826 EYELID CYST, LEFT: ICD-10-CM

## 2023-09-20 DIAGNOSIS — Z01.818 PREOPERATIVE EXAMINATION: Primary | ICD-10-CM

## 2023-09-20 PROCEDURE — 99214 OFFICE O/P EST MOD 30 MIN: CPT | Performed by: FAMILY MEDICINE

## 2023-09-20 NOTE — PROGRESS NOTES
FAMILY PRACTICE PRE-OPERATIVE EVALUATION  [facility]    NAME: Cyrus Hidalgo  AGE: 46 y.o. SEX: male  : 1970     DATE: 2023    [facility] Pre-Operative Evaluation      Chief Complaint: Pre-operative Evaluation     Surgery: Left upper eyelid excision of lesion  Anticipated Date of Surgery: 10/03/23     History of Present Illness:     Patient has no prior history of bleeding issues or blood clots. Chronic conditions, medications and allergies were reviewed. There is no currently active infectious process. Assessment of Cardiac Risk:  · No unstable or severe angina or MI in the last 6 weeks or history of stent placement in the last year   · No decompensated heart failure (e.g. New onset heart failure, NYHA functional class IV heart failure, or worsening existing heart failure) in past 3 mos. · No severe heart valve disease including aortic stenosis or symptomatic mitral stenosis     Exercise Capacity:  · Able to walk 4 blocks without symptoms  · Able to walk 2 flights without symptoms    NO Prior Anesthesia Reactions       No prolonged steroid use in past 6 mos. P.A.T. If done reviewed. Review of Systems:     Review of Systems   Constitutional: Negative for fatigue and fever. HENT: Negative for congestion and sore throat. Eyes: Negative for visual disturbance. Respiratory: Negative for cough, shortness of breath and wheezing. Cardiovascular: Negative for chest pain, palpitations and leg swelling. Gastrointestinal: Negative for abdominal pain, anal bleeding, constipation, diarrhea, nausea and vomiting. Endocrine: Negative for cold intolerance and heat intolerance. Musculoskeletal: Negative for arthralgias and joint swelling. Skin: Negative for color change. Neurological: Negative for dizziness, seizures, syncope, weakness and light-headedness.    Psychiatric/Behavioral: Negative for agitation, confusion, sleep disturbance and suicidal ideas. Current Problem List:     Patient Active Problem List   Diagnosis   • Diabetes mellitus without complication (HCC)   • Chronic bilateral low back pain without sciatica   • Lumbar spondylosis   • Tinea pedis of both feet   • Gastroesophageal reflux disease without esophagitis   • Eyelid cyst, left   • Mixed hyperlipidemia   • Preoperative examination       Allergies:      Allergies   Allergen Reactions   • Morphine GI Intolerance     Morphine patch only, per pt       Current Medications:       Current Outpatient Medications:   •  Alcohol Swabs 70 % PADS, As directed, Disp: , Rfl:   •  Blood Glucose Monitoring Suppl (FREESTYLE LITE) FRANCIS, by Other route daily, Disp: 1 each, Rfl: 0  •  clotrimazole (LOTRIMIN) 1 % cream, Apply topically 2 (two) times a day, Disp: 60 g, Rfl: 0  •  Diclofenac Sodium (VOLTAREN) 1 %, Apply 2 g topically 4 (four) times a day, Disp: 150 g, Rfl: 2  •  docusate sodium (COLACE) 100 mg capsule, Take 1 capsule (100 mg total) by mouth in the morning, Disp: 30 capsule, Rfl: 1  •  Empagliflozin 25 MG TABS, Take 1 tablet (25 mg total) by mouth daily, Disp: 90 tablet, Rfl: 3  •  fluticasone (FLONASE) 50 mcg/act nasal spray, 1 spray into each nostril daily, Disp: 1 Bottle, Rfl: 1  •  glucose blood (FREESTYLE LITE) test strip, TEST TWICE DAILY, Disp: 100 each, Rfl: 5  •  hydrOXYzine pamoate (VISTARIL) 50 mg capsule, , Disp: , Rfl:   •  Lancets (FREESTYLE) lancets, by Other route daily Use as instructed, Disp: 100 each, Rfl: 5  •  omeprazole (PriLOSEC) 40 MG capsule, TAKE 1 CAPSULE BY MOUTH EVERY DAY BEFORE BREAKFAST, Disp: 30 capsule, Rfl: 2    Past Medical History:       Past Medical History:   Diagnosis Date   • Anxiety    • Chronic back pain    • Depression    • Diabetes mellitus ([address])         Past Surgical History:   Procedure Laterality Date   • CHOLECYSTECTOMY  2007   • HERNIA REPAIR  2006        Family History   Problem Relation Age of Onset   • Diabetes Mother         Mellitus   • Osteoarthritis Maternal Grandmother    • Hypertension Maternal Grandfather    • Nephrolithiasis Family    • Cancer Maternal Uncle         Unknown   • No Known Problems Father         Social History     Socioeconomic History   • Marital status: Single     Spouse name: Not on file   • Number of children: Not on file   • Years of education: Not on file   • Highest education level: Not on file   Occupational History   • Not on file   Tobacco Use   • Smoking status: Never   • Smokeless tobacco: Never   Vaping Use   • Vaping Use: Never used   Substance and Sexual Activity   • Alcohol use: Not Currently   • Drug use: Never   • Sexual activity: Yes   Other Topics Concern   • Not on file   Social History Narrative   • Not on file     Social Determinants of Health     Financial Resource Strain: Low Risk  (7/27/2023)    Overall Financial Resource Strain (CARDIA)    • Difficulty of Paying Living Expenses: Not hard at all   Food Insecurity: No Food Insecurity (7/27/2023)    Hunger Vital Sign    • Worried About Running Out of Food in the Last Year: Never true    • Ran Out of Food in the Last Year: Never true   Transportation Needs: No Transportation Needs (7/27/2023)    PRAPARE - Transportation    • Lack of Transportation (Medical): No    • Lack of Transportation (Non-Medical): No   Physical Activity: Not on file   Stress: Not on file   Social Connections: Not on file   Intimate Partner Violence: Not on file   Housing Stability: Not on file        Physical Exam:     /80 (BP Location: Right arm, Patient Position: Sitting, Cuff Size: Standard)   Pulse 89   Temp 98.7 °F (37.1 °C) (Temporal)   Resp 16   Ht 5' 9" (1.753 m)   Wt 92.5 kg (204 lb)   SpO2 97%   BMI 30.13 kg/m²     Physical Exam  Constitutional:       Appearance: He is well-developed. HENT:      Head: Normocephalic and atraumatic.    Eyes:      Conjunctiva/sclera: Conjunctivae normal.      Pupils: Pupils are equal, round, and reactive to light. Neck:      Vascular: No JVD. Cardiovascular:      Rate and Rhythm: Normal rate and regular rhythm. Heart sounds: Normal heart sounds. No murmur heard. No friction rub. No gallop. Pulmonary:      Effort: Pulmonary effort is normal. No respiratory distress. Breath sounds: Normal breath sounds. No wheezing. Abdominal:      General: Bowel sounds are normal. There is no distension. Palpations: Abdomen is soft. Tenderness: There is no abdominal tenderness. Musculoskeletal:         General: Normal range of motion. Cervical back: Normal range of motion and neck supple. Skin:     General: Skin is warm and dry. Neurological:      Mental Status: He is alert and oriented to person, place, and time. Deep Tendon Reflexes: Reflexes are normal and symmetric. Psychiatric:         Behavior: Behavior normal.         Thought Content: Thought content normal.         Judgment: Judgment normal.         Operative site has been examined and clear of skin infection and inflammation. Assessment & Recommendations:     Patient is cleared for surgery as detailed above.      Surgical Procedure risk category: low risk    Patient specific operative risk categegory: low risk

## 2023-09-26 DIAGNOSIS — K21.9 GASTROESOPHAGEAL REFLUX DISEASE WITHOUT ESOPHAGITIS: ICD-10-CM

## 2023-09-27 RX ORDER — OMEPRAZOLE 40 MG/1
CAPSULE, DELAYED RELEASE ORAL
Qty: 30 CAPSULE | Refills: 2 | Status: SHIPPED | OUTPATIENT
Start: 2023-09-27

## 2023-10-03 PROCEDURE — 88305 TISSUE EXAM BY PATHOLOGIST: CPT | Performed by: PATHOLOGY

## 2023-10-04 ENCOUNTER — LAB REQUISITION (OUTPATIENT)
Dept: LAB | Facility: HOSPITAL | Age: 53
End: 2023-10-04
Payer: MEDICARE

## 2023-10-04 DIAGNOSIS — D23.121 OTHER BENIGN NEOPLASM OF SKIN OF LEFT UPPER EYELID, INCLUDING CANTHUS: ICD-10-CM

## 2023-10-09 PROCEDURE — 88305 TISSUE EXAM BY PATHOLOGIST: CPT | Performed by: PATHOLOGY

## 2023-11-22 ENCOUNTER — OFFICE VISIT (OUTPATIENT)
Dept: FAMILY MEDICINE CLINIC | Facility: CLINIC | Age: 53
End: 2023-11-22

## 2023-11-22 VITALS
SYSTOLIC BLOOD PRESSURE: 112 MMHG | DIASTOLIC BLOOD PRESSURE: 70 MMHG | TEMPERATURE: 97.9 F | OXYGEN SATURATION: 96 % | WEIGHT: 207 LBS | HEART RATE: 72 BPM | RESPIRATION RATE: 16 BRPM | HEIGHT: 69 IN | BODY MASS INDEX: 30.66 KG/M2

## 2023-11-22 DIAGNOSIS — M54.50 CHRONIC BILATERAL LOW BACK PAIN WITHOUT SCIATICA: ICD-10-CM

## 2023-11-22 DIAGNOSIS — E78.2 MIXED HYPERLIPIDEMIA: ICD-10-CM

## 2023-11-22 DIAGNOSIS — E11.9 DIABETES MELLITUS WITHOUT COMPLICATION (HCC): Primary | ICD-10-CM

## 2023-11-22 DIAGNOSIS — Z02.9 ENCOUNTER FOR NARCOTIC CONTRACT DISCUSSION: ICD-10-CM

## 2023-11-22 DIAGNOSIS — G89.29 CHRONIC BILATERAL LOW BACK PAIN WITHOUT SCIATICA: ICD-10-CM

## 2023-11-22 DIAGNOSIS — K21.9 GASTROESOPHAGEAL REFLUX DISEASE WITHOUT ESOPHAGITIS: ICD-10-CM

## 2023-11-22 PROBLEM — H02.826 EYELID CYST, LEFT: Status: RESOLVED | Noted: 2023-04-26 | Resolved: 2023-11-22

## 2023-11-22 PROBLEM — Z01.818 PREOPERATIVE EXAMINATION: Status: RESOLVED | Noted: 2023-09-20 | Resolved: 2023-11-22

## 2023-11-22 PROCEDURE — 99214 OFFICE O/P EST MOD 30 MIN: CPT | Performed by: FAMILY MEDICINE

## 2023-11-22 PROCEDURE — 80307 DRUG TEST PRSMV CHEM ANLYZR: CPT | Performed by: FAMILY MEDICINE

## 2023-11-22 RX ORDER — TRAMADOL HYDROCHLORIDE 50 MG/1
50 TABLET ORAL DAILY PRN
Qty: 30 TABLET | Refills: 0 | Status: SHIPPED | OUTPATIENT
Start: 2023-11-22

## 2023-11-22 NOTE — ASSESSMENT & PLAN NOTE
Lab Results   Component Value Date    HGBA1C 8.4 (H) 08/04/2023     - HBA1C uncontrolled   - Advised to adhere to strict dietary and lifestyle measures  - Continue jardiance to 25mg QD  - Repeat HBA1C  - Refuses statin therapy

## 2023-11-22 NOTE — PROGRESS NOTES
Name: David Everett      : 1970      MRN: [id number]  Encounter Provider: Lawyer Amber MD  Encounter Date: 2023   Encounter department: [address]     1. Diabetes mellitus without complication [facility])  Assessment & Plan:    Lab Results   Component Value Date    HGBA1C 8.4 (H) 2023     - HBA1C uncontrolled   - Advised to adhere to strict dietary and lifestyle measures  - Continue jardiance to 25mg QD  - Repeat HBA1C  - Refuses statin therapy      2. Chronic bilateral low back pain without sciatica  Assessment & Plan:  - Patient has tried pain management injections, NSAIDs, PT and tylenol without relief. - Will therefore order tramadol 50mg QD PRN for severe pain. - Narcotic contract signed and drug screen ordered. Orders:  -     Drug Toxicology Monitoring 8 Screen, Urine  -     traMADol (Ultram) 50 mg tablet; Take 1 tablet (50 mg total) by mouth daily as needed for severe pain or moderate pain    3. Encounter for narcotic contract discussion  -     Drug Toxicology Monitoring 8 Screen, Urine  -     traMADol (Ultram) 50 mg tablet; Take 1 tablet (50 mg total) by mouth daily as needed for severe pain or moderate pain    4. Gastroesophageal reflux disease without esophagitis  Assessment & Plan:  - Symptoms much improved. - Continue PPI 40mg QD, plan to decrease to 20mg QD on next visit. 5. Mixed hyperlipidemia  Assessment & Plan:  - Advised to obtain blood work  - Is not interested in statin therapy. BMI Counseling: Body mass index is 30.57 kg/m². The BMI is above normal. Nutrition recommendations include decreasing portion sizes, encouraging healthy choices of fruits and vegetables, decreasing fast food intake, moderation in carbohydrate intake and reducing intake of saturated and trans fat. Exercise recommendations include exercising 3-5 times per week. No pharmacotherapy was ordered.  Patient referred to PCP. Rationale for BMI follow-up plan is due to patient being overweight or obese. Depression Screening and Follow-up Plan: Patient was screened for depression during today's encounter. They screened negative with a PHQ-2 score of 0. Subjective      Pleasant patient 62-year-old male with past medical his type 2 diabetes mellitus presents the office today for follow-up visit of his chronic conditions. Reports complaince with medications. States he has been controlling what he eats through portion control. No other complaints except for chronic back pain that is worse at night when he lays down to sleep. Review of Systems   Constitutional:  Negative for fever. HENT:  Negative for congestion and sore throat. Eyes:  Negative for visual disturbance. Respiratory:  Negative for cough, shortness of breath and wheezing. Cardiovascular:  Negative for palpitations and leg swelling. Gastrointestinal:  Negative for abdominal pain, anal bleeding, constipation, diarrhea, nausea and vomiting. Endocrine: Negative for cold intolerance and heat intolerance. Musculoskeletal:  Positive for back pain. Negative for arthralgias and joint swelling. Skin:  Negative for color change. Neurological:  Negative for syncope and light-headedness. Psychiatric/Behavioral:  Negative for agitation, sleep disturbance and suicidal ideas.         Current Outpatient Medications on File Prior to Visit   Medication Sig    Alcohol Swabs 70 % PADS As directed    Blood Glucose Monitoring Suppl (FREESTYLE LITE) FRANCIS by Other route daily    clotrimazole (LOTRIMIN) 1 % cream Apply topically 2 (two) times a day    Diclofenac Sodium (VOLTAREN) 1 % Apply 2 g topically 4 (four) times a day    docusate sodium (COLACE) 100 mg capsule Take 1 capsule (100 mg total) by mouth in the morning    Empagliflozin 25 MG TABS Take 1 tablet (25 mg total) by mouth daily    fluticasone (FLONASE) 50 mcg/act nasal spray 1 spray into each nostril daily    glucose blood (FREESTYLE LITE) test strip TEST TWICE DAILY    hydrOXYzine pamoate (VISTARIL) 50 mg capsule     Lancets (FREESTYLE) lancets by Other route daily Use as instructed    omeprazole (PriLOSEC) 40 MG capsule TAKE 1 CAPSULE BY MOUTH EVERY DAY BEFORE BREAKFAST       Objective     /70 (BP Location: Left arm, Patient Position: Sitting, Cuff Size: Large)   Pulse 72   Temp 97.9 °F (36.6 °C) (Temporal)   Resp 16   Ht 5' 9" (1.753 m)   Wt 93.9 kg (207 lb)   SpO2 96%   BMI 30.57 kg/m²     Physical Exam  Constitutional:       Appearance: He is well-developed. HENT:      Head: Normocephalic and atraumatic. Eyes:      Conjunctiva/sclera: Conjunctivae normal.      Pupils: Pupils are equal, round, and reactive to light. Neck:      Vascular: No JVD. Cardiovascular:      Rate and Rhythm: Normal rate and regular rhythm. Heart sounds: Normal heart sounds. No murmur heard. No friction rub. No gallop. Pulmonary:      Effort: Pulmonary effort is normal. No respiratory distress. Breath sounds: Normal breath sounds. No wheezing. Abdominal:      General: Bowel sounds are normal. There is no distension. Palpations: Abdomen is soft. Tenderness: There is no abdominal tenderness. Musculoskeletal:         General: Normal range of motion. Cervical back: Normal range of motion and neck supple. Skin:     General: Skin is warm and dry. Neurological:      Mental Status: He is alert and oriented to person, place, and time. Deep Tendon Reflexes: Reflexes are normal and symmetric. Psychiatric:         Behavior: Behavior normal.         Thought Content:  Thought content normal.         Judgment: Judgment normal.       Red Escalante MD

## 2023-11-22 NOTE — ASSESSMENT & PLAN NOTE
- Patient has tried pain management injections, NSAIDs, PT and tylenol without relief. - Will therefore order tramadol 50mg QD PRN for severe pain. - Narcotic contract signed and drug screen ordered.

## 2023-11-27 LAB — MISCELLANEOUS LAB TEST RESULT: NORMAL

## 2023-12-24 DIAGNOSIS — K21.9 GASTROESOPHAGEAL REFLUX DISEASE WITHOUT ESOPHAGITIS: ICD-10-CM

## 2023-12-28 RX ORDER — OMEPRAZOLE 40 MG/1
CAPSULE, DELAYED RELEASE ORAL
Qty: 30 CAPSULE | Refills: 2 | Status: SHIPPED | OUTPATIENT
Start: 2023-12-28

## 2024-03-21 DIAGNOSIS — K21.9 GASTROESOPHAGEAL REFLUX DISEASE WITHOUT ESOPHAGITIS: ICD-10-CM

## 2024-03-21 RX ORDER — OMEPRAZOLE 40 MG/1
CAPSULE, DELAYED RELEASE ORAL
Qty: 30 CAPSULE | Refills: 2 | Status: SHIPPED | OUTPATIENT
Start: 2024-03-21

## 2024-04-17 ENCOUNTER — OFFICE VISIT (OUTPATIENT)
Dept: FAMILY MEDICINE CLINIC | Facility: CLINIC | Age: 54
End: 2024-04-17

## 2024-04-17 VITALS
HEART RATE: 66 BPM | WEIGHT: 199 LBS | RESPIRATION RATE: 16 BRPM | HEIGHT: 69 IN | BODY MASS INDEX: 29.47 KG/M2 | DIASTOLIC BLOOD PRESSURE: 77 MMHG | OXYGEN SATURATION: 97 % | SYSTOLIC BLOOD PRESSURE: 135 MMHG | TEMPERATURE: 98 F

## 2024-04-17 DIAGNOSIS — E11.9 TYPE 2 DIABETES MELLITUS WITHOUT COMPLICATION, WITHOUT LONG-TERM CURRENT USE OF INSULIN (HCC): ICD-10-CM

## 2024-04-17 DIAGNOSIS — E11.9 DIABETES MELLITUS WITHOUT COMPLICATION (HCC): Primary | ICD-10-CM

## 2024-04-17 DIAGNOSIS — E78.2 MIXED HYPERLIPIDEMIA: ICD-10-CM

## 2024-04-17 DIAGNOSIS — K21.9 GASTROESOPHAGEAL REFLUX DISEASE WITHOUT ESOPHAGITIS: ICD-10-CM

## 2024-04-17 DIAGNOSIS — M54.50 CHRONIC BILATERAL LOW BACK PAIN WITHOUT SCIATICA: ICD-10-CM

## 2024-04-17 DIAGNOSIS — G89.29 CHRONIC BILATERAL LOW BACK PAIN WITHOUT SCIATICA: ICD-10-CM

## 2024-04-17 DIAGNOSIS — Z02.9 ENCOUNTER FOR NARCOTIC CONTRACT DISCUSSION: ICD-10-CM

## 2024-04-17 PROCEDURE — 99214 OFFICE O/P EST MOD 30 MIN: CPT | Performed by: FAMILY MEDICINE

## 2024-04-17 RX ORDER — TRAMADOL HYDROCHLORIDE 50 MG/1
50 TABLET ORAL DAILY PRN
Qty: 30 TABLET | Refills: 0 | Status: SHIPPED | OUTPATIENT
Start: 2024-04-17

## 2024-04-17 NOTE — ASSESSMENT & PLAN NOTE
- Patient has tried pain management injections, NSAIDs, PT and tylenol without relief.   - Will therefore order tramadol 50mg QD PRN for severe pain.   - Narcotic contract signed and drug screen ordered on last visit.

## 2024-04-17 NOTE — PROGRESS NOTES
Name: Art Hidalgo      : 1970      MRN: [id number]  Encounter Provider: Bennett Dasilva MD  Encounter Date: 2024   Encounter department: [facility]    Assessment & Plan     1. Diabetes mellitus without complication (HCC)  Assessment & Plan:    Lab Results   Component Value Date    HGBA1C 8.4 (H) 2023     - HBA1C uncontrolled   - Advised to adhere to strict dietary and lifestyle measures  - Continue jardiance to 25mg QD  - Repeat HBA1C  - Refuses statin therapy    Orders:  -     Albumin / creatinine urine ratio; Future    2. Mixed hyperlipidemia  Assessment & Plan:  - Advised to obtain blood work  - Is not interested in statin therapy.         3. Chronic bilateral low back pain without sciatica  Assessment & Plan:  - Patient has tried pain management injections, NSAIDs, PT and tylenol without relief.   - Will therefore order tramadol 50mg QD PRN for severe pain.   - Narcotic contract signed and drug screen ordered on last visit.     Orders:  -     traMADol (Ultram) 50 mg tablet; Take 1 tablet (50 mg total) by mouth daily as needed for severe pain or moderate pain    4. Encounter for narcotic contract discussion  -     traMADol (Ultram) 50 mg tablet; Take 1 tablet (50 mg total) by mouth daily as needed for severe pain or moderate pain    5. Type 2 diabetes mellitus without complication, without long-term current use of insulin (HCC)  -     Empagliflozin 25 MG TABS; Take 1 tablet (25 mg total) by mouth daily    6. Gastroesophageal reflux disease without esophagitis  Assessment & Plan:  - Symptoms much improved.   - Continue PPI 40mg QD PRN             Subjective      Pleasant patient 53-year-old male with past medical his type 2 diabetes mellitus presents the office today for follow-up visit of his chronic conditions.  Reports complaince with medications. States he has been controlling what he eats through portion control. No other complaints  except for chronic back pain that is worse at night when he lays down to sleep.       Review of Systems   Constitutional:  Negative for fatigue and fever.   HENT:  Negative for congestion and sore throat.    Eyes:  Negative for visual disturbance.   Respiratory:  Negative for cough, shortness of breath and wheezing.    Cardiovascular:  Negative for chest pain, palpitations and leg swelling.   Gastrointestinal:  Negative for abdominal pain, anal bleeding, constipation, diarrhea, nausea and vomiting.   Endocrine: Negative for cold intolerance and heat intolerance.   Musculoskeletal:  Negative for arthralgias and joint swelling.   Skin:  Negative for color change.   Neurological:  Negative for dizziness, seizures, syncope, weakness and light-headedness.   Psychiatric/Behavioral:  Negative for agitation, confusion, sleep disturbance and suicidal ideas.        Current Outpatient Medications on File Prior to Visit   Medication Sig    Alcohol Swabs 70 % PADS As directed    Blood Glucose Monitoring Suppl (FREESTYLE LITE) FRANCIS by Other route daily    fluticasone (FLONASE) 50 mcg/act nasal spray 1 spray into each nostril daily    glucose blood (FREESTYLE LITE) test strip TEST TWICE DAILY    hydrOXYzine pamoate (VISTARIL) 50 mg capsule     Lancets (FREESTYLE) lancets by Other route daily Use as instructed    omeprazole (PriLOSEC) 40 MG capsule TAKE 1 CAPSULE BY MOUTH EVERY DAY BEFORE BREAKFAST    [DISCONTINUED] clotrimazole (LOTRIMIN) 1 % cream Apply topically 2 (two) times a day    [DISCONTINUED] Diclofenac Sodium (VOLTAREN) 1 % Apply 2 g topically 4 (four) times a day    [DISCONTINUED] docusate sodium (COLACE) 100 mg capsule Take 1 capsule (100 mg total) by mouth in the morning    [DISCONTINUED] Empagliflozin 25 MG TABS Take 1 tablet (25 mg total) by mouth daily    [DISCONTINUED] traMADol (Ultram) 50 mg tablet Take 1 tablet (50 mg total) by mouth daily as needed for severe pain or moderate pain       Objective     /77  "(BP Location: Right arm, Patient Position: Sitting, Cuff Size: Large)   Pulse 66   Temp 98 °F (36.7 °C) (Temporal)   Resp 16   Ht 5' 9\" (1.753 m)   Wt 90.3 kg (199 lb)   SpO2 97%   BMI 29.39 kg/m²     Physical Exam  Constitutional:       Appearance: He is well-developed.   HENT:      Head: Normocephalic and atraumatic.   Eyes:      Conjunctiva/sclera: Conjunctivae normal.      Pupils: Pupils are equal, round, and reactive to light.   Neck:      Vascular: No JVD.   Cardiovascular:      Rate and Rhythm: Normal rate and regular rhythm.      Heart sounds: Normal heart sounds. No murmur heard.     No friction rub. No gallop.   Pulmonary:      Effort: Pulmonary effort is normal. No respiratory distress.      Breath sounds: Normal breath sounds. No wheezing.   Abdominal:      General: Bowel sounds are normal. There is no distension.      Palpations: Abdomen is soft.      Tenderness: There is no abdominal tenderness.   Musculoskeletal:         General: Normal range of motion.      Cervical back: Normal range of motion and neck supple.   Skin:     General: Skin is warm and dry.   Neurological:      Mental Status: He is alert and oriented to person, place, and time.      Deep Tendon Reflexes: Reflexes are normal and symmetric.   Psychiatric:         Behavior: Behavior normal.         Thought Content: Thought content normal.         Judgment: Judgment normal.       Bennett Dasilva MD    "

## 2024-07-27 ENCOUNTER — APPOINTMENT (OUTPATIENT)
Dept: LAB | Facility: HOSPITAL | Age: 54
End: 2024-07-27
Payer: MEDICARE

## 2024-07-27 DIAGNOSIS — E78.2 MIXED HYPERLIPIDEMIA: ICD-10-CM

## 2024-07-27 DIAGNOSIS — E11.9 DIABETES MELLITUS WITHOUT COMPLICATION (HCC): ICD-10-CM

## 2024-07-27 LAB
CHOLEST SERPL-MCNC: 165 MG/DL
CREAT UR-MCNC: 63.2 MG/DL
EST. AVERAGE GLUCOSE BLD GHB EST-MCNC: 324 MG/DL
HBA1C MFR BLD: 12.9 %
HDLC SERPL-MCNC: 51 MG/DL
LDLC SERPL CALC-MCNC: 99 MG/DL (ref 0–100)
MICROALBUMIN UR-MCNC: <7 MG/L
NONHDLC SERPL-MCNC: 114 MG/DL
TRIGL SERPL-MCNC: 75 MG/DL

## 2024-07-27 PROCEDURE — 82570 ASSAY OF URINE CREATININE: CPT

## 2024-07-27 PROCEDURE — 83036 HEMOGLOBIN GLYCOSYLATED A1C: CPT

## 2024-07-27 PROCEDURE — 36415 COLL VENOUS BLD VENIPUNCTURE: CPT

## 2024-07-27 PROCEDURE — 80061 LIPID PANEL: CPT

## 2024-07-27 PROCEDURE — 82043 UR ALBUMIN QUANTITATIVE: CPT

## 2024-07-29 ENCOUNTER — OFFICE VISIT (OUTPATIENT)
Dept: FAMILY MEDICINE CLINIC | Facility: CLINIC | Age: 54
End: 2024-07-29

## 2024-07-29 VITALS
OXYGEN SATURATION: 98 % | SYSTOLIC BLOOD PRESSURE: 123 MMHG | RESPIRATION RATE: 18 BRPM | DIASTOLIC BLOOD PRESSURE: 72 MMHG | WEIGHT: 193 LBS | TEMPERATURE: 97.6 F | BODY MASS INDEX: 28.58 KG/M2 | HEIGHT: 69 IN | HEART RATE: 61 BPM

## 2024-07-29 DIAGNOSIS — G89.29 CHRONIC BILATERAL LOW BACK PAIN WITHOUT SCIATICA: ICD-10-CM

## 2024-07-29 DIAGNOSIS — K21.9 GASTROESOPHAGEAL REFLUX DISEASE WITHOUT ESOPHAGITIS: ICD-10-CM

## 2024-07-29 DIAGNOSIS — E11.9 TYPE 2 DIABETES MELLITUS WITHOUT COMPLICATION, WITHOUT LONG-TERM CURRENT USE OF INSULIN (HCC): Primary | ICD-10-CM

## 2024-07-29 DIAGNOSIS — M47.816 LUMBAR SPONDYLOSIS: ICD-10-CM

## 2024-07-29 DIAGNOSIS — M54.50 CHRONIC BILATERAL LOW BACK PAIN WITHOUT SCIATICA: ICD-10-CM

## 2024-07-29 PROBLEM — R13.10 DYSPHAGIA: Status: RESOLVED | Noted: 2024-07-29 | Resolved: 2024-07-29

## 2024-07-29 PROBLEM — R13.10 DYSPHAGIA: Status: ACTIVE | Noted: 2024-07-29

## 2024-07-29 PROCEDURE — 99213 OFFICE O/P EST LOW 20 MIN: CPT | Performed by: FAMILY MEDICINE

## 2024-07-29 RX ORDER — INSULIN GLARGINE 100 [IU]/ML
10 INJECTION, SOLUTION SUBCUTANEOUS
Qty: 15 ML | Refills: 3 | Status: SHIPPED | OUTPATIENT
Start: 2024-07-29

## 2024-07-29 RX ORDER — BLOOD-GLUCOSE METER
KIT MISCELLANEOUS
Qty: 100 EACH | Refills: 5 | Status: SHIPPED | OUTPATIENT
Start: 2024-07-29

## 2024-07-29 RX ORDER — BLOOD-GLUCOSE METER
KIT MISCELLANEOUS DAILY
Qty: 1 EACH | Refills: 0 | Status: SHIPPED | OUTPATIENT
Start: 2024-07-29

## 2024-07-29 RX ORDER — FAMOTIDINE 20 MG/1
20 TABLET, FILM COATED ORAL 2 TIMES DAILY
Qty: 30 TABLET | Refills: 3 | Status: SHIPPED | OUTPATIENT
Start: 2024-07-29

## 2024-07-29 NOTE — PROGRESS NOTES
Ambulatory Visit  Name: Art Hidalgo      : 1970      MRN: [id number]  Encounter Provider: Urban Jimenez MD  Encounter Date: 2024   Encounter department: [facility]    Assessment & Plan   1. Type 2 diabetes mellitus without complication, without long-term current use of insulin ([facility])  Assessment & Plan:    Lab Results   Component Value Date    HGBA1C 12.9 (H) 2024   Not at goal.   Was not adherent to diet or exercise regimen.   Continue jardiance.   Started Lantus 10 units HS.   Return with logs.   Patient educated extensively on risks of poor glycemic control .  Orders:  -     IRIS Diabetic eye exam  -     Albumin / creatinine urine ratio; Future; Expected date: 10/29/2024  -     Blood Glucose Monitoring Suppl (FreeStyle Lite) FRANCIS; Use daily  -     glucose blood (FREESTYLE LITE) test strip; TEST TWICE DAILY  -     Insulin Glargine Solostar (Lantus SoloStar) 100 UNIT/ML SOPN; Inject 0.1 mL (10 Units total) under the skin daily at bedtime  2. Chronic bilateral low back pain without sciatica  Assessment & Plan:  - Patient has tried pain management injections, NSAIDs, PT,  and tylenol without relief.   - Patient was recently given a trial of Tramadol which helped modestly.   - Patient educated on indications to be on opioid therapy.   - No radiculopathy on exam.   - Low suspicion for need to order an MRI   -Discussed home exercises.   - Refused PT  3. Lumbar spondylosis  Assessment & Plan:  Stable.   No signs of radiculopathy.   Refusing PT.     Orders:  -     Diclofenac Sodium (VOLTAREN) 1 %; Apply 2 g topically 4 (four) times a day  4. Gastroesophageal reflux disease without esophagitis  Assessment & Plan:  Failed therapy with Omeprazole.   Will trial pepcid 20 mg tablet daily.   Discussed dietary recommendations and benefits of weight loss.   Instructed to eliminate coffee and hot sauce from diet.   Orders:  -     famotidine (PEPCID)  20 mg tablet; Take 1 tablet (20 mg total) by mouth 2 (two) times a day       History of Present Illness     Patient presents to the clinic to establish care with new PCP. Today we discussed patients dietary and exercise habits in detail. We discussed his medication adherence. We discussed his chronic low back pain and how he manages his pain at home. Patient is well appearing and pleasant throughout the visit.         Review of Systems   Constitutional:  Negative for activity change, diaphoresis and fever.   HENT:  Negative for congestion, facial swelling, rhinorrhea and sore throat.    Eyes:  Negative for visual disturbance.   Respiratory:  Negative for cough, shortness of breath and wheezing.    Cardiovascular:  Negative for chest pain, palpitations and leg swelling.   Gastrointestinal:  Negative for abdominal distention, abdominal pain, constipation, diarrhea, nausea and vomiting.   Endocrine: Negative for polyuria.   Genitourinary:  Negative for dysuria and urgency.   Musculoskeletal:  Positive for arthralgias, back pain and myalgias. Negative for neck pain.   Skin:  Negative for rash.   Neurological:  Negative for dizziness, seizures, weakness, light-headedness, numbness and headaches.   Psychiatric/Behavioral:  Negative for sleep disturbance and suicidal ideas. The patient is not nervous/anxious.      Pertinent Medical History       Past Medical History   Past Medical History:   Diagnosis Date    Anxiety     Chronic back pain     Depression     Diabetes mellitus (HCC)      Past Surgical History:   Procedure Laterality Date    CHOLECYSTECTOMY  2007    HERNIA REPAIR  2006     Family History   Problem Relation Age of Onset    Diabetes Mother         Mellitus    Osteoarthritis Maternal Grandmother     Hypertension Maternal Grandfather     Nephrolithiasis Family     Cancer Maternal Uncle         Unknown    No Known Problems Father      Current Outpatient Medications on File Prior to Visit   Medication Sig Dispense  "Refill    Alcohol Swabs 70 % PADS As directed      Empagliflozin 25 MG TABS Take 1 tablet (25 mg total) by mouth daily 90 tablet 3    fluticasone (FLONASE) 50 mcg/act nasal spray 1 spray into each nostril daily 1 Bottle 1    hydrOXYzine pamoate (VISTARIL) 50 mg capsule       Lancets (FREESTYLE) lancets by Other route daily Use as instructed 100 each 5     No current facility-administered medications on file prior to visit.     Allergies   Allergen Reactions    Morphine GI Intolerance     Morphine patch only, per pt      Current Outpatient Medications on File Prior to Visit   Medication Sig Dispense Refill    Alcohol Swabs 70 % PADS As directed      Empagliflozin 25 MG TABS Take 1 tablet (25 mg total) by mouth daily 90 tablet 3    fluticasone (FLONASE) 50 mcg/act nasal spray 1 spray into each nostril daily 1 Bottle 1    hydrOXYzine pamoate (VISTARIL) 50 mg capsule       Lancets (FREESTYLE) lancets by Other route daily Use as instructed 100 each 5     No current facility-administered medications on file prior to visit.      Social History     Tobacco Use    Smoking status: Never    Smokeless tobacco: Never   Vaping Use    Vaping status: Never Used   Substance and Sexual Activity    Alcohol use: Not Currently    Drug use: Never    Sexual activity: Yes     Objective     /72 (BP Location: Right arm, Patient Position: Sitting, Cuff Size: Standard)   Pulse 61   Temp 97.6 °F (36.4 °C) (Temporal)   Resp 18   Ht 5' 9\" (1.753 m)   Wt 87.5 kg (193 lb)   SpO2 98%   BMI 28.50 kg/m²     Physical Exam  Constitutional:       Appearance: Normal appearance.   HENT:      Head: Normocephalic.      Nose: Nose normal. No congestion or rhinorrhea.      Mouth/Throat:      Mouth: Mucous membranes are moist.   Eyes:      Conjunctiva/sclera: Conjunctivae normal.   Cardiovascular:      Rate and Rhythm: Normal rate and regular rhythm.      Pulses: Normal pulses.      Heart sounds: Normal heart sounds.   Pulmonary:      Effort: " Pulmonary effort is normal.      Breath sounds: Normal breath sounds.   Abdominal:      General: There is no distension.      Palpations: Abdomen is soft.      Tenderness: There is no abdominal tenderness.   Musculoskeletal:         General: No swelling, tenderness or deformity. Normal range of motion.      Cervical back: Normal range of motion and neck supple. No tenderness.   Skin:     General: Skin is warm.      Findings: No erythema or lesion.   Neurological:      General: No focal deficit present.      Mental Status: He is alert and oriented to person, place, and time.   Psychiatric:         Mood and Affect: Mood normal.         Behavior: Behavior normal.         Thought Content: Thought content normal.         Judgment: Judgment normal.       Administrative Statements   I have spent a total time of 20 minutes in caring for this patient on the day of the visit/encounter including Diagnostic results, Risks and benefits of tx options, Instructions for management, Patient and family education, Risk factor reductions, Impressions, Counseling / Coordination of care, Documenting in the medical record, Obtaining or reviewing history  , and Communicating with other healthcare professionals .

## 2024-07-29 NOTE — ASSESSMENT & PLAN NOTE
Failed therapy with Omeprazole.   Will trial pepcid 20 mg tablet daily.   Discussed dietary recommendations and benefits of weight loss.   Instructed to eliminate coffee and hot sauce from diet.

## 2024-07-31 NOTE — ASSESSMENT & PLAN NOTE
Lab Results   Component Value Date    HGBA1C 12.9 (H) 07/27/2024   Not at goal.   Was not adherent to diet or exercise regimen.   Continue jardiance.   Started Lantus 10 units HS.   Return with logs.   Patient educated extensively on risks of poor glycemic control .

## 2024-07-31 NOTE — ASSESSMENT & PLAN NOTE
- Patient has tried pain management injections, NSAIDs, PT,  and tylenol without relief.   - Patient was recently given a trial of Tramadol which helped modestly.   - Patient educated on indications to be on opioid therapy.   - No radiculopathy on exam.   - Low suspicion for need to order an MRI   -Discussed home exercises.   - Refused PT

## 2024-08-01 ENCOUNTER — TELEPHONE (OUTPATIENT)
Dept: FAMILY MEDICINE CLINIC | Facility: CLINIC | Age: 54
End: 2024-08-01

## 2024-08-01 NOTE — TELEPHONE ENCOUNTER
Pt needs refill of needle for the Insulin Glargine Solostar (Lantus SoloStar) 100 UNIT/ML SOPN  also pt stated insure doesn't cover the  Blood Glucose Monitoring Suppl (FreeStyle Lite) FRANCIS  if you can send other that insure cover it     Please advise, thank you

## 2024-08-07 DIAGNOSIS — E11.9 TYPE 2 DIABETES MELLITUS WITHOUT COMPLICATION, WITHOUT LONG-TERM CURRENT USE OF INSULIN (HCC): ICD-10-CM

## 2024-08-07 RX ORDER — INSULIN GLARGINE 100 [IU]/ML
10 INJECTION, SOLUTION SUBCUTANEOUS
Qty: 15 ML | Refills: 3 | Status: SHIPPED | OUTPATIENT
Start: 2024-08-07

## 2024-08-07 RX ORDER — BLOOD-GLUCOSE METER
KIT MISCELLANEOUS DAILY
Qty: 1 EACH | Refills: 0 | Status: SHIPPED | OUTPATIENT
Start: 2024-08-07

## 2024-08-09 ENCOUNTER — APPOINTMENT (EMERGENCY)
Dept: RADIOLOGY | Facility: HOSPITAL | Age: 54
End: 2024-08-09
Payer: MEDICARE

## 2024-08-09 ENCOUNTER — HOSPITAL ENCOUNTER (EMERGENCY)
Facility: HOSPITAL | Age: 54
Discharge: HOME | End: 2024-08-09
Attending: EMERGENCY MEDICINE
Payer: MEDICARE

## 2024-08-09 VITALS
HEART RATE: 81 BPM | SYSTOLIC BLOOD PRESSURE: 125 MMHG | TEMPERATURE: 98.5 F | WEIGHT: 194.67 LBS | OXYGEN SATURATION: 96 % | RESPIRATION RATE: 16 BRPM | BODY MASS INDEX: 28.75 KG/M2 | DIASTOLIC BLOOD PRESSURE: 72 MMHG

## 2024-08-09 DIAGNOSIS — R21 RASH: ICD-10-CM

## 2024-08-09 DIAGNOSIS — R73.9 HYPERGLYCEMIA: Primary | ICD-10-CM

## 2024-08-09 DIAGNOSIS — R06.02 SOB (SHORTNESS OF BREATH): ICD-10-CM

## 2024-08-09 LAB
ALBUMIN SERPL BCG-MCNC: 4.5 G/DL (ref 3.5–5)
ALP SERPL-CCNC: 78 U/L (ref 34–104)
ALT SERPL W P-5'-P-CCNC: 9 U/L (ref 7–52)
ANION GAP SERPL CALCULATED.3IONS-SCNC: 11 MMOL/L (ref 4–13)
AST SERPL W P-5'-P-CCNC: 19 U/L (ref 13–39)
BASOPHILS # BLD AUTO: 0.02 THOUSANDS/ÂΜL (ref 0–0.1)
BASOPHILS NFR BLD AUTO: 0 % (ref 0–1)
BILIRUB SERPL-MCNC: 1.24 MG/DL (ref 0.2–1)
BUN SERPL-MCNC: 13 MG/DL (ref 5–25)
CALCIUM SERPL-MCNC: 9 MG/DL (ref 8.4–10.2)
CHLORIDE SERPL-SCNC: 102 MMOL/L (ref 96–108)
CO2 SERPL-SCNC: 23 MMOL/L (ref 21–32)
CREAT SERPL-MCNC: 0.81 MG/DL (ref 0.6–1.3)
EOSINOPHIL # BLD AUTO: 0.17 THOUSAND/ÂΜL (ref 0–0.61)
EOSINOPHIL NFR BLD AUTO: 2 % (ref 0–6)
ERYTHROCYTE [DISTWIDTH] IN BLOOD BY AUTOMATED COUNT: 12.9 % (ref 11.6–15.1)
FLUAV RNA RESP QL NAA+PROBE: NEGATIVE
FLUBV RNA RESP QL NAA+PROBE: NEGATIVE
GFR SERPL CREATININE-BSD FRML MDRD: 100 ML/MIN/1.73SQ M
GLUCOSE SERPL-MCNC: 320 MG/DL (ref 65–140)
GLUCOSE SERPL-MCNC: 323 MG/DL (ref 65–140)
HCT VFR BLD AUTO: 43.5 % (ref 36.5–49.3)
HGB BLD-MCNC: 14 G/DL (ref 12–17)
IMM GRANULOCYTES # BLD AUTO: 0.02 THOUSAND/UL (ref 0–0.2)
IMM GRANULOCYTES NFR BLD AUTO: 0 % (ref 0–2)
LYMPHOCYTES # BLD AUTO: 1.48 THOUSANDS/ÂΜL (ref 0.6–4.47)
LYMPHOCYTES NFR BLD AUTO: 15 % (ref 14–44)
MCH RBC QN AUTO: 30 PG (ref 26.8–34.3)
MCHC RBC AUTO-ENTMCNC: 32.2 G/DL (ref 31.4–37.4)
MCV RBC AUTO: 93 FL (ref 82–98)
MONOCYTES # BLD AUTO: 0.5 THOUSAND/ÂΜL (ref 0.17–1.22)
MONOCYTES NFR BLD AUTO: 5 % (ref 4–12)
NEUTROPHILS # BLD AUTO: 7.41 THOUSANDS/ÂΜL (ref 1.85–7.62)
NEUTS SEG NFR BLD AUTO: 78 % (ref 43–75)
NRBC BLD AUTO-RTO: 0 /100 WBCS
PLATELET # BLD AUTO: 272 THOUSANDS/UL (ref 149–390)
PMV BLD AUTO: 11.2 FL (ref 8.9–12.7)
POTASSIUM SERPL-SCNC: 3.9 MMOL/L (ref 3.5–5.3)
PROT SERPL-MCNC: 7.1 G/DL (ref 6.4–8.4)
RBC # BLD AUTO: 4.66 MILLION/UL (ref 3.88–5.62)
RSV RNA RESP QL NAA+PROBE: NEGATIVE
SARS-COV-2 RNA RESP QL NAA+PROBE: NEGATIVE
SODIUM SERPL-SCNC: 136 MMOL/L (ref 135–147)
WBC # BLD AUTO: 9.6 THOUSAND/UL (ref 4.31–10.16)

## 2024-08-09 PROCEDURE — 71045 X-RAY EXAM CHEST 1 VIEW: CPT

## 2024-08-09 PROCEDURE — 85025 COMPLETE CBC W/AUTO DIFF WBC: CPT

## 2024-08-09 PROCEDURE — 99285 EMERGENCY DEPT VISIT HI MDM: CPT

## 2024-08-09 PROCEDURE — 0241U HB NFCT DS VIR RESP RNA 4 TRGT: CPT

## 2024-08-09 PROCEDURE — 82948 REAGENT STRIP/BLOOD GLUCOSE: CPT

## 2024-08-09 PROCEDURE — 99284 EMERGENCY DEPT VISIT MOD MDM: CPT

## 2024-08-09 PROCEDURE — 36415 COLL VENOUS BLD VENIPUNCTURE: CPT

## 2024-08-09 PROCEDURE — 80053 COMPREHEN METABOLIC PANEL: CPT

## 2024-08-09 RX ORDER — HYDROXYZINE HYDROCHLORIDE 50 MG/1
50 TABLET, FILM COATED ORAL ONCE
Status: COMPLETED | OUTPATIENT
Start: 2024-08-09 | End: 2024-08-09

## 2024-08-09 RX ORDER — HYDROCORTISONE 25 MG/ML
LOTION TOPICAL 2 TIMES DAILY
Qty: 50 ML | Refills: 0 | Status: SHIPPED | OUTPATIENT
Start: 2024-08-09

## 2024-08-09 RX ORDER — HYDROXYZINE HYDROCHLORIDE 25 MG/1
25 TABLET, FILM COATED ORAL EVERY 6 HOURS
Qty: 12 TABLET | Refills: 0 | Status: SHIPPED | OUTPATIENT
Start: 2024-08-09

## 2024-08-09 RX ADMIN — HYDROXYZINE HYDROCHLORIDE 50 MG: 50 TABLET, FILM COATED ORAL at 14:29

## 2024-08-09 NOTE — ED PROVIDER NOTES
History  Chief Complaint   Patient presents with    Rash    Shortness of Breath     Patient with generalized rash since Wednesday for poison ivy per patient; using benadryl with no relief. Today c/o SOB     54 year old male presenting today with concerns of shortness of breath and rash for the last couple days. Patient denies any wheezing and CP. Some cough and congestion. There is no nauseas, vomiting, belly pain. No diarrhea or constipation. No hx asthma. Hx DMII. He denies dizziness, vision changes. States he always has polyuria.        Prior to Admission Medications   Prescriptions Last Dose Informant Patient Reported? Taking?   Alcohol Swabs 70 % PADS   Yes No   Sig: As directed   Blood Glucose Monitoring Suppl (FreeStyle Lite) FRANCIS   No No   Sig: Use daily   Diclofenac Sodium (VOLTAREN) 1 %   No No   Sig: Apply 2 g topically 4 (four) times a day   Empagliflozin 25 MG TABS   No No   Sig: Take 1 tablet (25 mg total) by mouth daily   Insulin Glargine Solostar (Lantus SoloStar) 100 UNIT/ML SOPN   No No   Sig: Inject 0.1 mL (10 Units total) under the skin daily at bedtime   Lancets (FREESTYLE) lancets   No No   Sig: by Other route daily Use as instructed   famotidine (PEPCID) 20 mg tablet   No No   Sig: Take 1 tablet (20 mg total) by mouth 2 (two) times a day   fluticasone (FLONASE) 50 mcg/act nasal spray   No No   Si spray into each nostril daily   glucose blood (FREESTYLE LITE) test strip   No No   Sig: TEST TWICE DAILY   hydrOXYzine pamoate (VISTARIL) 50 mg capsule   Yes No      Facility-Administered Medications: None       Past Medical History:   Diagnosis Date    Anxiety     Chronic back pain     Depression     Diabetes mellitus (HCC)        Past Surgical History:   Procedure Laterality Date    CHOLECYSTECTOMY  2007    HERNIA REPAIR  2006       Family History   Problem Relation Age of Onset    Diabetes Mother         Mellitus    Osteoarthritis Maternal Grandmother     Hypertension Maternal Grandfather      Nephrolithiasis Family     Cancer Maternal Uncle         Unknown    No Known Problems Father      I have reviewed and agree with the history as documented.    E-Cigarette/Vaping    E-Cigarette Use Never User      E-Cigarette/Vaping Substances    Nicotine No     THC No     CBD No     Flavoring No     Other No     Unknown No      Social History     Tobacco Use    Smoking status: Never    Smokeless tobacco: Never   Vaping Use    Vaping status: Never Used   Substance Use Topics    Alcohol use: Not Currently    Drug use: Never       Review of Systems   Constitutional:  Negative for chills and fever.   HENT:  Negative for ear pain and sore throat.    Eyes:  Negative for pain and visual disturbance.   Respiratory:  Positive for shortness of breath. Negative for apnea, cough, choking, chest tightness, wheezing and stridor.    Cardiovascular:  Negative for chest pain and palpitations.   Gastrointestinal:  Negative for abdominal pain, constipation, diarrhea, nausea and vomiting.   Genitourinary:  Negative for dysuria and hematuria.   Musculoskeletal:  Negative for arthralgias and back pain.   Skin:  Positive for rash. Negative for color change.   Neurological:  Negative for seizures and syncope.   All other systems reviewed and are negative.      Physical Exam  Physical Exam  Vitals and nursing note reviewed.   Constitutional:       General: He is not in acute distress.     Appearance: He is not ill-appearing.   HENT:      Head: Normocephalic and atraumatic.   Eyes:      General: No scleral icterus.     Conjunctiva/sclera: Conjunctivae normal.      Pupils: Pupils are equal, round, and reactive to light.   Cardiovascular:      Rate and Rhythm: Normal rate and regular rhythm.      Pulses: Normal pulses.   Pulmonary:      Effort: Pulmonary effort is normal. No respiratory distress.      Breath sounds: No decreased breath sounds, wheezing, rhonchi or rales.   Abdominal:      Tenderness: There is no abdominal tenderness.    Musculoskeletal:         General: Normal range of motion.      Cervical back: Normal range of motion.   Skin:     General: Skin is warm and dry.      Capillary Refill: Capillary refill takes less than 2 seconds.      Coloration: Skin is not jaundiced.      Findings: Erythema (flushed in appearance in face and neck) present. No ecchymosis or rash.      Nails: There is no clubbing.   Neurological:      Mental Status: He is alert and oriented to person, place, and time.         Vital Signs  ED Triage Vitals [08/09/24 1322]   Temperature Pulse Respirations Blood Pressure SpO2   98.5 °F (36.9 °C) 81 16 125/72 96 %      Temp Source Heart Rate Source Patient Position - Orthostatic VS BP Location FiO2 (%)   Oral Monitor Lying Left arm --      Pain Score       --           Vitals:    08/09/24 1322   BP: 125/72   Pulse: 81   Patient Position - Orthostatic VS: Lying         Visual Acuity      ED Medications  Medications   hydrOXYzine HCL (ATARAX) tablet 50 mg (50 mg Oral Given 8/9/24 1429)       Diagnostic Studies  Results Reviewed       Procedure Component Value Units Date/Time    FLU/RSV/COVID - if FLU/RSV clinically relevant [[id number]]  (Normal) Collected: 08/09/24 1343    Lab Status: Final result Specimen: Nares from Nose Updated: 08/09/24 1447     SARS-CoV-2 Negative     INFLUENZA A PCR Negative     INFLUENZA B PCR Negative     RSV PCR Negative    Narrative:      FOR PEDIATRIC PATIENTS - copy/paste COVID Guidelines URL to browser: [email or web address]    SARS-CoV-2 assay is a Nucleic Acid Amplification assay intended for the  qualitative detection of nucleic acid from SARS-CoV-2 in nasopharyngeal  swabs. Results are for the presumptive identification of SARS-CoV-2 RNA.    Positive results are indicative of infection with SARS-CoV-2, the virus  causing COVID-19, but do not rule out bacterial infection or co-infection  with other viruses. Laboratories within the  [place] and its  territories are required to report all positive results to the appropriate  public health authorities. Negative results do not preclude SARS-CoV-2  infection and should not be used as the sole basis for treatment or other  patient management decisions. Negative results must be combined with  clinical observations, patient history, and epidemiological information.  This test has not been FDA cleared or approved.    This test has been authorized by FDA under an Emergency Use Authorization  (EUA). This test is only authorized for the duration of time the  declaration that circumstances exist justifying the authorization of the  emergency use of an in vitro diagnostic tests for detection of SARS-CoV-2  virus and/or diagnosis of COVID-19 infection under section 564(b)(1) of  the Act, 21 U.S.C. 360bbb-3(b)(1), unless the authorization is terminated  or revoked sooner. The test has been validated but independent review by FDA  and CLIA is pending.    Test performed using [employer] GeneXpert: This RT-PCR assay targets N2,  a region unique to SARS-CoV-2. A conserved region in the E-gene was chosen  for pan-Sarbecovirus detection which includes SARS-CoV-2.    According to CMS-2020-01-R, this platform meets the definition of high-throughput technology.    Comprehensive metabolic panel [[id number]]  (Abnormal) Collected: 08/09/24 1343    Lab Status: Final result Specimen: Blood from Arm, Left Updated: 08/09/24 1413     Sodium 136 mmol/L      Potassium 3.9 mmol/L      Chloride 102 mmol/L      CO2 23 mmol/L      ANION GAP 11 mmol/L      BUN 13 mg/dL      Creatinine 0.81 mg/dL      Glucose 320 mg/dL      Calcium 9.0 mg/dL      AST 19 U/L      ALT 9 U/L      Alkaline Phosphatase 78 U/L      Total Protein 7.1 g/dL      Albumin 4.5 g/dL      Total Bilirubin 1.24 mg/dL      eGFR 100 ml/min/1.73sq m     Narrative:      National Kidney Disease Foundation guidelines for Chronic Kidney Disease (CKD):     Stage 1 with  normal or high GFR (GFR > 90 mL/min/1.73 square meters)    Stage 2 Mild CKD (GFR = 60-89 mL/min/1.73 square meters)    Stage 3A Moderate CKD (GFR = 45-59 mL/min/1.73 square meters)    Stage 3B Moderate CKD (GFR = 30-44 mL/min/1.73 square meters)    Stage 4 Severe CKD (GFR = 15-29 mL/min/1.73 square meters)    Stage 5 End Stage CKD (GFR <15 mL/min/1.73 square meters)  Note: GFR calculation is accurate only with a steady state creatinine    CBC and differential [[id number]]  (Abnormal) Collected: 08/09/24 1343    Lab Status: Final result Specimen: Blood from Arm, Left Updated: 08/09/24 1358     WBC 9.60 Thousand/uL      RBC 4.66 Million/uL      Hemoglobin 14.0 g/dL      Hematocrit 43.5 %      MCV 93 fL      MCH 30.0 pg      MCHC 32.2 g/dL      RDW 12.9 %      MPV 11.2 fL      Platelets 272 Thousands/uL      nRBC 0 /100 WBCs      Segmented % 78 %      Immature Grans % 0 %      Lymphocytes % 15 %      Monocytes % 5 %      Eosinophils Relative 2 %      Basophils Relative 0 %      Absolute Neutrophils 7.41 Thousands/µL      Absolute Immature Grans 0.02 Thousand/uL      Absolute Lymphocytes 1.48 Thousands/µL      Absolute Monocytes 0.50 Thousand/µL      Eosinophils Absolute 0.17 Thousand/µL      Basophils Absolute 0.02 Thousands/µL     Fingerstick Glucose (POCT) [[id number]]  (Abnormal) Collected: 08/09/24 1343    Lab Status: Final result Specimen: Blood Updated: 08/09/24 1343     POC Glucose 323 mg/dl                    XR chest 1 view portable   Final Result by Maritza Nguyễn MD (08/09 1401)      No acute cardiopulmonary disease.            Workstation performed: [id number]                    Procedures  ECG 12 Lead Documentation Only    Date/Time: 8/9/2024 3:00 PM    Performed by: Rachid Francisco PA-C  Authorized by: Rachid Francisco PA-C    Indications / Diagnosis:  Sob  ECG reviewed by me, the ED Provider: yes    Patient location:  ED  Previous ECG:     Previous ECG:  Compared to current    Similarity:  No  change    Comparison to cardiac monitor: No    Interpretation:     Interpretation: normal    Rate:     ECG rate:  77    ECG rate assessment: normal    Rhythm:     Rhythm: sinus rhythm    Ectopy:     Ectopy: none    QRS:     QRS axis:  Normal    QRS intervals:  Normal  Conduction:     Conduction: normal    ST segments:     ST segments:  Normal  T waves:     T waves: normal             ED Course  ED Course as of 08/09/24 1500   Fri Aug 09, 2024   1415 ANION GAP: 11                                 SBIRT 22yo+      Flowsheet Row Most Recent Value   Initial Alcohol Screen: US AUDIT-C     1. How often do you have a drink containing alcohol? 0 Filed at: 08/09/2024 1318   2. How many drinks containing alcohol do you have on a typical day you are drinking?  0 Filed at: 08/09/2024 1318   3a. Male UNDER 65: How often do you have five or more drinks on one occasion? 0 Filed at: 08/09/2024 1318   3b. FEMALE Any Age, or MALE 65+: How often do you have 4 or more drinks on one occassion? 0 Filed at: 08/09/2024 1318   Audit-C Score 0 Filed at: 08/09/2024 1318   TARYN: How many times in the past year have you...    Used an illegal drug or used a prescription medication for non-medical reasons? Never Filed at: 08/09/2024 1318                      Medical Decision Making  54 year old male, presenting today with concerns of SOB, rash on  his face/arms/legs. Itchy, non painful. Flushed in appearance on exam. Lungs sounds reassuring and wnl, no adventitious sounds.  CXR  EKG  EKG was reviewed by me, please see above documentation.  Plain radiograph(s) were reviewed by me, please see above documentation.  Patient with hx elevated A1c, which poct glucose and basic labs.  Atarax for itch.  Lab workup reassuring. Anion gap normal.  Patient feeling better after atarax.  Discharge with atarax and hydrocortisone lotion.  ------------------------------------------------------------  Strict return precautions discussed. Patient at time of discharge  "well-appearing in no acute distress, all questions answered. Patient agreeable to plan.  Patient's vitals, lab/imaging results, diagnosis, and treatment plan were discussed with the patient. All new/changed medications were discussed with patient, specifically, route of administration, how often and when to take, and where they can be picked up. Strict return precautions as well as close follow up with PCP was discussed with the patient and the patient was agreeable to my recommendations.  Patient verbally acknowledged understanding of the above communications. All labs reviewed and utilized in the medical decision making process (if labs were ordered). Portions of the record may have been created with voice recognition software.  Occasional wrong word or \"sound a like\" substitutions may have occurred due to the inherent limitations of voice recognition software.  Read the chart carefully and recognize, using context, where substitutions have occurred.      Amount and/or Complexity of Data Reviewed  Labs: ordered. Decision-making details documented in ED Course.  Radiology: ordered.    Risk  Prescription drug management.                 Disposition  Final diagnoses:   Hyperglycemia   Rash   SOB (shortness of breath)     Time reflects when diagnosis was documented in both MDM as applicable and the Disposition within this note       Time User Action Codes Description Comment    8/9/2024  2:23 PM Rachid Francisco [R73.9] Hyperglycemia     8/9/2024  2:23 PM Rachid Francisco [R21] Rash     8/9/2024  2:23 PM Rachid Francisco [R06.02] SOB (shortness of breath)           ED Disposition       ED Disposition   Discharge    Condition   Stable    Date/Time   Fri Aug 9, 2024 1346    Comment   Art Hidalgo discharge to home/self care.                   Follow-up Information       Follow up With Specialties Details Why Contact Info Additional Information    [facility] Emergency Department " Emergency Medicine Go to  If symptoms worsen [address] [facility] [id number] [phone] [facility] Emergency Department            Discharge Medication List as of 8/9/2024  2:44 PM        START taking these medications    Details   hydrocortisone 2.5 % lotion Apply topically 2 (two) times a day, Starting Fri 8/9/2024, Normal      hydrOXYzine HCL (ATARAX) 25 mg tablet Take 1 tablet (25 mg total) by mouth every 6 (six) hours, Starting Fri 8/9/2024, Normal           CONTINUE these medications which have NOT CHANGED    Details   Alcohol Swabs 70 % PADS As directed, Historical Med      Blood Glucose Monitoring Suppl (FreeStyle Lite) FRANCIS Use daily, Starting Wed 8/7/2024, Normal      Diclofenac Sodium (VOLTAREN) 1 % Apply 2 g topically 4 (four) times a day, Starting Mon 7/29/2024, Normal      Empagliflozin 25 MG TABS Take 1 tablet (25 mg total) by mouth daily, Starting Wed 4/17/2024, Until Sat 4/12/2025, Normal      famotidine (PEPCID) 20 mg tablet Take 1 tablet (20 mg total) by mouth 2 (two) times a day, Starting Mon 7/29/2024, Normal      fluticasone (FLONASE) 50 mcg/act nasal spray 1 spray into each nostril daily, Starting Thu 6/25/2020, Normal      glucose blood (FREESTYLE LITE) test strip TEST TWICE DAILY, Normal      hydrOXYzine pamoate (VISTARIL) 50 mg capsule Starting Sat 9/10/2022, Historical Med      Insulin Glargine Solostar (Lantus SoloStar) 100 UNIT/ML SOPN Inject 0.1 mL (10 Units total) under the skin daily at bedtime, Starting Wed 8/7/2024, Normal      Lancets (FREESTYLE) lancets by Other route daily Use as instructed, Starting Thu 6/20/2019, Normal             No discharge procedures on file.    PDMP Review         Value Time User    PDMP Reviewed  Yes 4/17/2024 12:00 PM Bennett Dasilva MD            ED Provider  Electronically Signed by             Rachid Francisco PA-C  08/09/24 5510

## 2024-09-23 ENCOUNTER — VBI (OUTPATIENT)
Dept: ADMINISTRATIVE | Facility: OTHER | Age: 54
End: 2024-09-23

## 2024-09-23 NOTE — TELEPHONE ENCOUNTER
09/23/24 4:19 PM     Chart reviewed for Hemoglobin A1c ; nothing is submitted to the patient's insurance at this time.     Migdalia Tariq MA   [facility]

## 2024-09-26 ENCOUNTER — TELEPHONE (OUTPATIENT)
Dept: FAMILY MEDICINE CLINIC | Facility: CLINIC | Age: 54
End: 2024-09-26

## 2024-09-26 DIAGNOSIS — E11.9 TYPE 2 DIABETES MELLITUS WITHOUT COMPLICATION, WITHOUT LONG-TERM CURRENT USE OF INSULIN (HCC): Primary | ICD-10-CM

## 2024-09-26 NOTE — TELEPHONE ENCOUNTER
Pt came into office requesting medication refill for     Jardiance 10 MG     Please send to pharmacy on file

## 2024-10-01 RX ORDER — PEN NEEDLE, DIABETIC 33 GX5/32"
1 NEEDLE, DISPOSABLE MISCELLANEOUS DAILY
Qty: 100 EACH | Refills: 3 | Status: SHIPPED | OUTPATIENT
Start: 2024-10-01

## 2024-10-01 NOTE — TELEPHONE ENCOUNTER
WIFE CAME IN ASKING WHAT IS GOING ON WITH HER 'S MEDICATION FOR HIS MEDICATION AND PEN NEEDLE SUPPLIES. HE'S BEEN OUT FOR 2 WEEKS. PLEASE ADVISE.

## 2024-11-20 ENCOUNTER — VBI (OUTPATIENT)
Dept: ADMINISTRATIVE | Facility: OTHER | Age: 54
End: 2024-11-20

## 2024-11-20 NOTE — TELEPHONE ENCOUNTER
11/20/24 9:02 AM     Chart reviewed for Hemoglobin A1c ; nothing is submitted to the patient's insurance at this time.     Dannie Magallanes MA   [facility]

## 2024-11-22 ENCOUNTER — OFFICE VISIT (OUTPATIENT)
Dept: FAMILY MEDICINE CLINIC | Facility: CLINIC | Age: 54
End: 2024-11-22

## 2024-11-22 ENCOUNTER — TELEPHONE (OUTPATIENT)
Dept: FAMILY MEDICINE CLINIC | Facility: CLINIC | Age: 54
End: 2024-11-22

## 2024-11-22 VITALS
WEIGHT: 200 LBS | HEART RATE: 60 BPM | RESPIRATION RATE: 19 BRPM | TEMPERATURE: 97.5 F | BODY MASS INDEX: 29.62 KG/M2 | HEIGHT: 69 IN | DIASTOLIC BLOOD PRESSURE: 88 MMHG | SYSTOLIC BLOOD PRESSURE: 128 MMHG | OXYGEN SATURATION: 98 %

## 2024-11-22 DIAGNOSIS — E11.9 TYPE 2 DIABETES MELLITUS WITHOUT COMPLICATION, WITHOUT LONG-TERM CURRENT USE OF INSULIN (HCC): Primary | ICD-10-CM

## 2024-11-22 LAB — SL AMB POCT HEMOGLOBIN AIC: 8.7 (ref ?–6.5)

## 2024-11-22 PROCEDURE — 83036 HEMOGLOBIN GLYCOSYLATED A1C: CPT

## 2024-11-22 PROCEDURE — 99213 OFFICE O/P EST LOW 20 MIN: CPT

## 2024-11-22 RX ORDER — BLOOD-GLUCOSE METER
KIT MISCELLANEOUS DAILY
Qty: 1 EACH | Refills: 0 | Status: SHIPPED | OUTPATIENT
Start: 2024-11-22

## 2024-11-22 RX ORDER — PEN NEEDLE, DIABETIC 33 GX5/32"
1 NEEDLE, DISPOSABLE MISCELLANEOUS DAILY
Qty: 100 EACH | Refills: 3 | Status: SHIPPED | OUTPATIENT
Start: 2024-11-22

## 2024-11-22 RX ORDER — LANCETS 28 GAUGE
EACH MISCELLANEOUS DAILY
Qty: 100 EACH | Refills: 5 | Status: SHIPPED | OUTPATIENT
Start: 2024-11-22

## 2024-11-22 RX ORDER — INSULIN GLARGINE 100 [IU]/ML
10 INJECTION, SOLUTION SUBCUTANEOUS
Qty: 15 ML | Refills: 3 | Status: SHIPPED | OUTPATIENT
Start: 2024-11-22

## 2024-11-22 RX ORDER — BLOOD-GLUCOSE METER
KIT MISCELLANEOUS
Qty: 100 EACH | Refills: 5 | Status: SHIPPED | OUTPATIENT
Start: 2024-11-22

## 2024-11-22 NOTE — PROGRESS NOTES
Name: Art Hidalgo      : 1970      MRN: [id number]  Encounter Provider: ACOSTA Noel  Encounter Date: 2024   Encounter department: [facility]  :  Assessment & Plan  Type 2 diabetes mellitus without complication, without long-term current use of insulin (HCC)  - Significant improvement with the A1c, Pt congratulated for the effort  - Will continue with current regiment of Jardiance and Lantus   - Encouraged to continue lifestyle modification and make appointment with ophthalmology   - F/u in 3 months  Lab Results   Component Value Date    HGBA1C 8.7 (A) 2024       Orders:    POCT hemoglobin A1c    Insulin Pen Needle (Pen Needles) 33G X 4 MM MISC; Use 1 each in the morning    Lancets (freestyle) lancets; Use daily Use as instructed    glucose blood (FREESTYLE LITE) test strip; TEST TWICE DAILY    Blood Glucose Monitoring Suppl (FreeStyle Lite) FRANCIS; Use daily    Insulin Glargine Solostar (Lantus SoloStar) 100 UNIT/ML SOPN; Inject 0.1 mL (10 Units total) under the skin daily at bedtime    Empagliflozin (Jardiance) 25 MG TABS; Take 1 tablet (25 mg total) by mouth every morning    CBC and differential; Future    Comprehensive metabolic panel; Future    Lipid panel; Future    TSH, 3rd generation with Free T4 reflex; Future    Hemoglobin A1C; Future    Diabetic Foot Exam    Patient's shoes and socks removed.    Right Foot/Ankle   Right Foot Inspection  Skin Exam: skin normal, skin intact, callus and callus. No dry skin, no warmth, no erythema, no maceration, no abnormal color, no pre-ulcer and no ulcer.     Toe Exam: ROM and strength within normal limits.     Sensory   Vibration: intact  Proprioception: intact  Monofilament testing: intact    Vascular  Capillary refills: < 3 seconds  The right DP pulse is 2+. The right PT pulse is 2+.     Left Foot/Ankle  Left Foot Inspection  Skin Exam: skin normal, skin intact and callus. No dry skin, no warmth, no  "erythema, no maceration, normal color, no pre-ulcer and no ulcer.     Toe Exam: ROM and strength within normal limits.     Sensory   Vibration: intact  Proprioception: intact  Monofilament testing: intact    Vascular  Capillary refills: < 3 seconds  The left DP pulse is 2+. The left PT pulse is 2+.     Assign Risk Category  No deformity present  No loss of protective sensation  No weak pulses  Risk: 0         History of Present Illness     Patient is a 54 y.o. male whom  has a past medical history of Anxiety, Chronic back pain, Depression, and Diabetes mellitus (HCC). who is seen today in office for a DM follow up. Pt represents with wife and they declined to use . Pt reports no acute concern at this time. He is compliance with medications. Pt complaint today includes medication refills, glucometer and needles for his insulin pen. He has not see the ophthalmologist this year, and wife says that they were told that Pt will be call for an appointment. Pt denies any vision changes.        Diabetes      Review of Systems   Constitutional: Negative.    HENT: Negative.     Eyes: Negative.    Respiratory: Negative.     Cardiovascular: Negative.    Gastrointestinal: Negative.    Genitourinary: Negative.    Musculoskeletal: Negative.    Skin: Negative.    Neurological: Negative.    Hematological: Negative.    Psychiatric/Behavioral: Negative.            Objective   /88 (BP Location: Right arm, Patient Position: Sitting, Cuff Size: Standard)   Pulse 60   Temp 97.5 °F (36.4 °C) (Temporal)   Resp 19   Ht 5' 9\" (1.753 m)   Wt 90.7 kg (200 lb)   SpO2 98%   BMI 29.53 kg/m²      Physical Exam  Constitutional:       Appearance: Normal appearance.   HENT:      Head: Normocephalic.      Right Ear: Tympanic membrane normal.      Left Ear: Tympanic membrane normal.      Nose: Nose normal.   Eyes:      Extraocular Movements: Extraocular movements intact.      Pupils: Pupils are equal, round, and reactive to light. "   Cardiovascular:      Rate and Rhythm: Normal rate.      Pulses: Normal pulses. no weak pulses.           Dorsalis pedis pulses are 2+ on the right side and 2+ on the left side.        Posterior tibial pulses are 2+ on the right side and 2+ on the left side.      Heart sounds: Normal heart sounds.   Pulmonary:      Effort: Pulmonary effort is normal.      Breath sounds: Normal breath sounds.   Abdominal:      General: Bowel sounds are normal.      Palpations: Abdomen is soft.   Musculoskeletal:         General: Normal range of motion.      Cervical back: Normal range of motion.   Feet:      Right foot:      Skin integrity: Callus present. No ulcer, skin breakdown, erythema, warmth or dry skin.      Left foot:      Skin integrity: Callus present. No ulcer, skin breakdown, erythema, warmth or dry skin.   Skin:     General: Skin is warm.   Neurological:      Mental Status: He is alert and oriented to person, place, and time.   Psychiatric:         Mood and Affect: Mood normal.         Behavior: Behavior normal.

## 2024-11-22 NOTE — ASSESSMENT & PLAN NOTE
- Significant improvement with the A1c, Pt congratulated for the effort  - Will continue with current regiment of Jardiance and Lantus   - Encouraged to continue lifestyle modification and make appointment with ophthalmology   - F/u in 3 months  Lab Results   Component Value Date    HGBA1C 8.7 (A) 11/22/2024       Orders:    POCT hemoglobin A1c    Insulin Pen Needle (Pen Needles) 33G X 4 MM MISC; Use 1 each in the morning    Lancets (freestyle) lancets; Use daily Use as instructed    glucose blood (FREESTYLE LITE) test strip; TEST TWICE DAILY    Blood Glucose Monitoring Suppl (FreeStyle Lite) FRNACIS; Use daily    Insulin Glargine Solostar (Lantus SoloStar) 100 UNIT/ML SOPN; Inject 0.1 mL (10 Units total) under the skin daily at bedtime    Empagliflozin (Jardiance) 25 MG TABS; Take 1 tablet (25 mg total) by mouth every morning    CBC and differential; Future    Comprehensive metabolic panel; Future    Lipid panel; Future    TSH, 3rd generation with Free T4 reflex; Future    Hemoglobin A1C; Future

## 2024-12-20 ENCOUNTER — VBI (OUTPATIENT)
Dept: ADMINISTRATIVE | Facility: OTHER | Age: 54
End: 2024-12-20

## 2024-12-20 NOTE — TELEPHONE ENCOUNTER
12/20/24 3:50 PM     Chart reviewed for Hemoglobin A1c was/were submitted to the patient's insurance.     Dannie Magallanes MA   [facility]

## 2025-04-23 ENCOUNTER — OFFICE VISIT (OUTPATIENT)
Dept: FAMILY MEDICINE CLINIC | Facility: CLINIC | Age: 55
End: 2025-04-23

## 2025-04-23 VITALS
SYSTOLIC BLOOD PRESSURE: 112 MMHG | BODY MASS INDEX: 29.03 KG/M2 | OXYGEN SATURATION: 97 % | RESPIRATION RATE: 16 BRPM | TEMPERATURE: 98 F | WEIGHT: 196 LBS | DIASTOLIC BLOOD PRESSURE: 64 MMHG | HEART RATE: 68 BPM | HEIGHT: 69 IN

## 2025-04-23 DIAGNOSIS — E78.2 MIXED HYPERLIPIDEMIA: ICD-10-CM

## 2025-04-23 DIAGNOSIS — Z76.89 ENCOUNTER TO ESTABLISH CARE WITH NEW DOCTOR: ICD-10-CM

## 2025-04-23 DIAGNOSIS — E11.9 TYPE 2 DIABETES MELLITUS WITHOUT COMPLICATION, WITHOUT LONG-TERM CURRENT USE OF INSULIN (HCC): Primary | ICD-10-CM

## 2025-04-23 LAB — SL AMB POCT HEMOGLOBIN AIC: 8 (ref ?–6.5)

## 2025-04-23 PROCEDURE — 83036 HEMOGLOBIN GLYCOSYLATED A1C: CPT | Performed by: FAMILY MEDICINE

## 2025-04-23 PROCEDURE — 99214 OFFICE O/P EST MOD 30 MIN: CPT | Performed by: FAMILY MEDICINE

## 2025-04-23 NOTE — ASSESSMENT & PLAN NOTE
Lab Results   Component Value Date/Time    CHOLESTEROL 165 07/27/2024 07:56 AM    TRIG 75 07/27/2024 07:56 AM    HDL 51 07/27/2024 07:56 AM    LDLCALC 99 07/27/2024 07:56 AM     Stable  Not taking any statin therapy at this time  Discussed need to repeat lipid panel  Low Fat Diet, regular Exercise

## 2025-04-23 NOTE — PROGRESS NOTES
Name: Art Hidalgo      : 1970      MRN: [id number]  Encounter Provider: Aletha Sawyer MD  Encounter Date: 2025   Encounter department: [facility]  :  Assessment & Plan  Type 2 diabetes mellitus without complication, without long-term current use of insulin (HCC)    Lab Results   Component Value Date    HGBA1C 8.0 (A) 2025    HGBA1C 8.7 (A) 2024    HGBA1C 12.9 (H) 2024     Improving hemoglobin A1c, not at goal yet  Current medications: Jardiance 25 mg daily and Lantus 12 units daily  Plan:  Increase Lantus from 12 units to 14 units daily  Continue Jardiance  Decrease refined carbohydrates in diet-cake/candy  Referred to podiatry for DM foot exam/foot pain  DM eye exam completed   Will recheck in 3 months    Orders:  •  POCT hemoglobin A1c  •  Ambulatory Referral to Podiatry; Future    Mixed hyperlipidemia  Lab Results   Component Value Date/Time    CHOLESTEROL 165 2024 07:56 AM    TRIG 75 2024 07:56 AM    HDL 51 2024 07:56 AM    LDLCALC 99 2024 07:56 AM     Stable  Not taking any statin therapy at this time  Discussed need to repeat lipid panel  Low Fat Diet, regular Exercise           Encounter to establish care with new doctor                History of Present Illness   Diabetes  Pertinent negatives for hypoglycemia include no dizziness or headaches. Pertinent negatives for diabetes include no chest pain.     Art Hidalgo is a 54 y.o. male  has a past medical history of Anxiety, Chronic back pain, Depression, and Diabetes mellitus (HCC). who presented to the office today accompanied by his partner to establish care with new PCP.  Patient states that he has been using the Jardiance daily and Lantus 12 units daily.  He has improved his diet, but does occasionally have cake or candy at night.        The following portions of the patient's history were reviewed and updated as appropriate:  "allergies, current medications, past family history, past medical history, past social history, past surgical history and problem list.    Review of Systems   Constitutional:  Negative for chills and fever.   HENT:  Negative for congestion, rhinorrhea and sore throat.    Respiratory:  Negative for cough and shortness of breath.    Cardiovascular:  Negative for chest pain.   Gastrointestinal:  Negative for diarrhea, nausea and vomiting.   Skin:  Negative for rash.   Neurological:  Negative for dizziness and headaches.       Objective   /64 (BP Location: Left arm, Patient Position: Sitting, Cuff Size: Standard)   Pulse 68   Temp 98 °F (36.7 °C) (Temporal)   Resp 16   Ht 5' 9\" (1.753 m)   Wt 88.9 kg (196 lb)   SpO2 97%   BMI 28.94 kg/m²      Physical Exam  Vitals and nursing note reviewed.   Constitutional:       General: He is not in acute distress.     Appearance: He is well-developed.   HENT:      Head: Normocephalic and atraumatic.   Eyes:      Conjunctiva/sclera: Conjunctivae normal.   Cardiovascular:      Rate and Rhythm: Normal rate and regular rhythm.      Heart sounds: No murmur heard.  Pulmonary:      Effort: Pulmonary effort is normal. No respiratory distress.      Breath sounds: Normal breath sounds.   Abdominal:      Palpations: Abdomen is soft.      Tenderness: There is no abdominal tenderness.   Musculoskeletal:         General: No swelling.      Cervical back: Neck supple.   Skin:     General: Skin is warm and dry.      Capillary Refill: Capillary refill takes less than 2 seconds.   Neurological:      Mental Status: He is alert and oriented to person, place, and time.   Psychiatric:         Mood and Affect: Mood normal.         Behavior: Behavior normal.         "

## 2025-04-23 NOTE — ASSESSMENT & PLAN NOTE
Lab Results   Component Value Date    HGBA1C 8.0 (A) 04/23/2025    HGBA1C 8.7 (A) 11/22/2024    HGBA1C 12.9 (H) 07/27/2024     Improving hemoglobin A1c, not at goal yet  Current medications: Jardiance 25 mg daily and Lantus 12 units daily  Plan:  Increase Lantus from 12 units to 14 units daily  Continue Jardiance  Decrease refined carbohydrates in diet-cake/candy  Referred to podiatry for DM foot exam/foot pain  DM eye exam completed 2024  Will recheck in 3 months    Orders:  •  POCT hemoglobin A1c  •  Ambulatory Referral to Podiatry; Future

## 2025-05-30 DIAGNOSIS — E11.9 TYPE 2 DIABETES MELLITUS WITHOUT COMPLICATION, WITHOUT LONG-TERM CURRENT USE OF INSULIN (HCC): ICD-10-CM

## 2025-05-30 RX ORDER — EMPAGLIFLOZIN 25 MG/1
25 TABLET, FILM COATED ORAL DAILY
Qty: 90 TABLET | Refills: 0 | Status: SHIPPED | OUTPATIENT
Start: 2025-05-30

## 2025-07-16 ENCOUNTER — APPOINTMENT (OUTPATIENT)
Dept: LAB | Facility: CLINIC | Age: 55
End: 2025-07-16
Payer: MEDICARE

## 2025-07-16 DIAGNOSIS — E11.9 TYPE 2 DIABETES MELLITUS WITHOUT COMPLICATION, WITHOUT LONG-TERM CURRENT USE OF INSULIN (HCC): ICD-10-CM

## 2025-07-16 LAB
CREAT UR-MCNC: 66.4 MG/DL
MICROALBUMIN UR-MCNC: <7 MG/L

## 2025-07-16 PROCEDURE — 82043 UR ALBUMIN QUANTITATIVE: CPT

## 2025-07-16 PROCEDURE — 82570 ASSAY OF URINE CREATININE: CPT

## 2025-07-23 ENCOUNTER — OFFICE VISIT (OUTPATIENT)
Dept: FAMILY MEDICINE CLINIC | Facility: CLINIC | Age: 55
End: 2025-07-23

## 2025-07-23 VITALS
TEMPERATURE: 98 F | DIASTOLIC BLOOD PRESSURE: 74 MMHG | BODY MASS INDEX: 30.07 KG/M2 | HEART RATE: 69 BPM | RESPIRATION RATE: 18 BRPM | OXYGEN SATURATION: 98 % | WEIGHT: 203 LBS | SYSTOLIC BLOOD PRESSURE: 118 MMHG | HEIGHT: 69 IN

## 2025-07-23 DIAGNOSIS — M54.41 CHRONIC BILATERAL LOW BACK PAIN WITH BILATERAL SCIATICA: ICD-10-CM

## 2025-07-23 DIAGNOSIS — E11.9 TYPE 2 DIABETES MELLITUS WITHOUT COMPLICATION, WITHOUT LONG-TERM CURRENT USE OF INSULIN (HCC): Primary | ICD-10-CM

## 2025-07-23 DIAGNOSIS — K21.9 GASTROESOPHAGEAL REFLUX DISEASE WITHOUT ESOPHAGITIS: ICD-10-CM

## 2025-07-23 DIAGNOSIS — M54.42 CHRONIC BILATERAL LOW BACK PAIN WITH BILATERAL SCIATICA: ICD-10-CM

## 2025-07-23 DIAGNOSIS — G89.29 CHRONIC BILATERAL LOW BACK PAIN WITH BILATERAL SCIATICA: ICD-10-CM

## 2025-07-23 LAB — SL AMB POCT HEMOGLOBIN AIC: 8.3 (ref ?–6.5)

## 2025-07-23 PROCEDURE — 83036 HEMOGLOBIN GLYCOSYLATED A1C: CPT | Performed by: FAMILY MEDICINE

## 2025-07-23 PROCEDURE — 99214 OFFICE O/P EST MOD 30 MIN: CPT | Performed by: FAMILY MEDICINE

## 2025-07-23 RX ORDER — BLOOD SUGAR DIAGNOSTIC
STRIP MISCELLANEOUS
Qty: 200 EACH | Refills: 3 | Status: SHIPPED | OUTPATIENT
Start: 2025-07-23

## 2025-07-23 RX ORDER — FAMOTIDINE 20 MG/1
20 TABLET, FILM COATED ORAL 2 TIMES DAILY
Qty: 30 TABLET | Refills: 3 | Status: SHIPPED | OUTPATIENT
Start: 2025-07-23 | End: 2025-08-08

## 2025-07-23 RX ORDER — BLOOD-GLUCOSE METER
KIT MISCELLANEOUS
Qty: 1 KIT | Refills: 0 | Status: SHIPPED | OUTPATIENT
Start: 2025-07-23

## 2025-07-23 RX ORDER — TIRZEPATIDE 2.5 MG/.5ML
2.5 INJECTION, SOLUTION SUBCUTANEOUS WEEKLY
Qty: 2 ML | Refills: 2 | Status: SHIPPED | OUTPATIENT
Start: 2025-07-23

## 2025-07-23 RX ORDER — LANCETS 33 GAUGE
EACH MISCELLANEOUS
Qty: 200 EACH | Refills: 3 | Status: SHIPPED | OUTPATIENT
Start: 2025-07-23

## 2025-07-23 RX ORDER — PREGABALIN 75 MG/1
75 CAPSULE ORAL 2 TIMES DAILY
Qty: 60 CAPSULE | Refills: 2 | Status: SHIPPED | OUTPATIENT
Start: 2025-07-23

## 2025-08-08 DIAGNOSIS — K21.9 GASTROESOPHAGEAL REFLUX DISEASE WITHOUT ESOPHAGITIS: ICD-10-CM

## 2025-08-08 RX ORDER — FAMOTIDINE 20 MG/1
20 TABLET, FILM COATED ORAL 2 TIMES DAILY
Qty: 180 TABLET | Refills: 0 | Status: SHIPPED | OUTPATIENT
Start: 2025-08-08